# Patient Record
Sex: MALE | Race: WHITE | Employment: FULL TIME | URBAN - METROPOLITAN AREA
[De-identification: names, ages, dates, MRNs, and addresses within clinical notes are randomized per-mention and may not be internally consistent; named-entity substitution may affect disease eponyms.]

---

## 2017-07-03 DIAGNOSIS — Z91.09 ENVIRONMENTAL ALLERGIES: ICD-10-CM

## 2017-07-05 RX ORDER — LORATADINE AND PSEUDOEPHEDRINE 10; 240 MG/1; MG/1
1 TABLET, EXTENDED RELEASE ORAL DAILY PRN
Qty: 30 TABLET | Refills: 1 | Status: SHIPPED | OUTPATIENT
Start: 2017-07-05 | End: 2017-08-02 | Stop reason: SDUPTHER

## 2017-07-18 ENCOUNTER — TELEPHONE (OUTPATIENT)
Dept: INTERNAL MEDICINE CLINIC | Age: 52
End: 2017-07-18

## 2017-07-18 DIAGNOSIS — Z13.1 SCREENING FOR DIABETES MELLITUS: Primary | ICD-10-CM

## 2017-07-18 DIAGNOSIS — Z13.220 LIPID SCREENING: ICD-10-CM

## 2017-08-02 ENCOUNTER — INITIAL CONSULT (OUTPATIENT)
Dept: PSYCHOLOGY | Age: 52
End: 2017-08-02

## 2017-08-02 ENCOUNTER — OFFICE VISIT (OUTPATIENT)
Dept: INTERNAL MEDICINE CLINIC | Age: 52
End: 2017-08-02

## 2017-08-02 VITALS
BODY MASS INDEX: 29.66 KG/M2 | SYSTOLIC BLOOD PRESSURE: 131 MMHG | OXYGEN SATURATION: 98 % | WEIGHT: 178 LBS | HEIGHT: 65 IN | DIASTOLIC BLOOD PRESSURE: 84 MMHG | HEART RATE: 69 BPM

## 2017-08-02 DIAGNOSIS — Z91.09 ENVIRONMENTAL ALLERGIES: ICD-10-CM

## 2017-08-02 DIAGNOSIS — Z00.00 ANNUAL PHYSICAL EXAM: Primary | ICD-10-CM

## 2017-08-02 DIAGNOSIS — Z11.59 NEED FOR HEPATITIS C SCREENING TEST: ICD-10-CM

## 2017-08-02 DIAGNOSIS — Z13.31 POSITIVE DEPRESSION SCREENING: ICD-10-CM

## 2017-08-02 DIAGNOSIS — R19.5 LOOSE STOOLS: ICD-10-CM

## 2017-08-02 DIAGNOSIS — R03.0 ELEVATED BLOOD PRESSURE READING: ICD-10-CM

## 2017-08-02 DIAGNOSIS — Z13.220 LIPID SCREENING: ICD-10-CM

## 2017-08-02 DIAGNOSIS — F43.23 ADJUSTMENT DISORDER WITH MIXED ANXIETY AND DEPRESSED MOOD: Primary | ICD-10-CM

## 2017-08-02 LAB
ANION GAP SERPL CALCULATED.3IONS-SCNC: 15 MMOL/L (ref 3–16)
BUN BLDV-MCNC: 14 MG/DL (ref 7–20)
CALCIUM SERPL-MCNC: 9.4 MG/DL (ref 8.3–10.6)
CHLORIDE BLD-SCNC: 99 MMOL/L (ref 99–110)
CO2: 24 MMOL/L (ref 21–32)
CREAT SERPL-MCNC: 0.7 MG/DL (ref 0.9–1.3)
GFR AFRICAN AMERICAN: >60
GFR NON-AFRICAN AMERICAN: >60
GLUCOSE BLD-MCNC: 88 MG/DL (ref 70–99)
HEPATITIS C ANTIBODY INTERPRETATION: NORMAL
POTASSIUM SERPL-SCNC: 4.5 MMOL/L (ref 3.5–5.1)
SODIUM BLD-SCNC: 138 MMOL/L (ref 136–145)
TSH REFLEX: 1.34 UIU/ML (ref 0.27–4.2)

## 2017-08-02 PROCEDURE — 90791 PSYCH DIAGNOSTIC EVALUATION: CPT | Performed by: PSYCHOLOGIST

## 2017-08-02 PROCEDURE — 99396 PREV VISIT EST AGE 40-64: CPT | Performed by: INTERNAL MEDICINE

## 2017-08-02 PROCEDURE — G0444 DEPRESSION SCREEN ANNUAL: HCPCS | Performed by: INTERNAL MEDICINE

## 2017-08-02 PROCEDURE — G8431 POS CLIN DEPRES SCRN F/U DOC: HCPCS | Performed by: INTERNAL MEDICINE

## 2017-08-02 RX ORDER — LORATADINE AND PSEUDOEPHEDRINE 10; 240 MG/1; MG/1
1 TABLET, EXTENDED RELEASE ORAL DAILY PRN
Qty: 30 TABLET | Refills: 5 | Status: SHIPPED | OUTPATIENT
Start: 2017-08-02 | End: 2017-11-17 | Stop reason: ALTCHOICE

## 2017-08-02 ASSESSMENT — PATIENT HEALTH QUESTIONNAIRE - PHQ9
2. FEELING DOWN, DEPRESSED OR HOPELESS: 2
SUM OF ALL RESPONSES TO PHQ9 QUESTIONS 1 & 2: 2
SUM OF ALL RESPONSES TO PHQ QUESTIONS 1-9: 9
10. IF YOU CHECKED OFF ANY PROBLEMS, HOW DIFFICULT HAVE THESE PROBLEMS MADE IT FOR YOU TO DO YOUR WORK, TAKE CARE OF THINGS AT HOME, OR GET ALONG WITH OTHER PEOPLE: 1
4. FEELING TIRED OR HAVING LITTLE ENERGY: 2
1. LITTLE INTEREST OR PLEASURE IN DOING THINGS: 0
8. MOVING OR SPEAKING SO SLOWLY THAT OTHER PEOPLE COULD HAVE NOTICED. OR THE OPPOSITE, BEING SO FIGETY OR RESTLESS THAT YOU HAVE BEEN MOVING AROUND A LOT MORE THAN USUAL: 0
3. TROUBLE FALLING OR STAYING ASLEEP: 2
5. POOR APPETITE OR OVEREATING: 1
6. FEELING BAD ABOUT YOURSELF - OR THAT YOU ARE A FAILURE OR HAVE LET YOURSELF OR YOUR FAMILY DOWN: 1
9. THOUGHTS THAT YOU WOULD BE BETTER OFF DEAD, OR OF HURTING YOURSELF: 1
7. TROUBLE CONCENTRATING ON THINGS, SUCH AS READING THE NEWSPAPER OR WATCHING TELEVISION: 0

## 2017-08-03 ENCOUNTER — PATIENT MESSAGE (OUTPATIENT)
Dept: INTERNAL MEDICINE CLINIC | Age: 52
End: 2017-08-03

## 2017-08-04 LAB
CHOLESTEROL, FASTING: 217 MG/DL (ref 0–199)
HDLC SERPL-MCNC: 51 MG/DL (ref 40–60)
LDL CHOLESTEROL CALCULATED: 128 MG/DL
TRIGLYCERIDE, FASTING: 188 MG/DL (ref 0–150)
VLDLC SERPL CALC-MCNC: 38 MG/DL

## 2017-09-13 ENCOUNTER — OFFICE VISIT (OUTPATIENT)
Dept: INTERNAL MEDICINE CLINIC | Age: 52
End: 2017-09-13

## 2017-09-13 VITALS
WEIGHT: 180 LBS | SYSTOLIC BLOOD PRESSURE: 128 MMHG | HEART RATE: 67 BPM | OXYGEN SATURATION: 100 % | DIASTOLIC BLOOD PRESSURE: 87 MMHG | BODY MASS INDEX: 29.72 KG/M2

## 2017-09-13 DIAGNOSIS — E78.2 MIXED HYPERLIPIDEMIA: ICD-10-CM

## 2017-09-13 DIAGNOSIS — R03.0 BLOOD PRESSURE ELEVATED WITHOUT HISTORY OF HTN: Primary | ICD-10-CM

## 2017-09-13 PROCEDURE — 99213 OFFICE O/P EST LOW 20 MIN: CPT | Performed by: INTERNAL MEDICINE

## 2017-09-14 PROBLEM — R03.0 BLOOD PRESSURE ELEVATED WITHOUT HISTORY OF HTN: Status: ACTIVE | Noted: 2017-09-14

## 2017-09-14 PROBLEM — R03.0 TRANSIENT ELEVATED BLOOD PRESSURE: Status: ACTIVE | Noted: 2017-09-14

## 2017-09-14 PROBLEM — R03.0 TRANSIENT ELEVATED BLOOD PRESSURE: Status: RESOLVED | Noted: 2017-09-14 | Resolved: 2017-09-14

## 2017-09-14 PROBLEM — E78.2 MIXED HYPERLIPIDEMIA: Status: ACTIVE | Noted: 2017-09-14

## 2017-10-12 ENCOUNTER — TELEPHONE (OUTPATIENT)
Dept: INTERNAL MEDICINE CLINIC | Age: 52
End: 2017-10-12

## 2017-10-12 DIAGNOSIS — Z91.09 ENVIRONMENTAL ALLERGIES: ICD-10-CM

## 2017-10-12 NOTE — TELEPHONE ENCOUNTER
Pt is requesting a refill for Fluticasone Nasal Spray. Pt has not filled there before. Please call in new rx.

## 2017-10-13 RX ORDER — FLUTICASONE PROPIONATE 50 MCG
2 SPRAY, SUSPENSION (ML) NASAL DAILY
Qty: 1 BOTTLE | Refills: 5 | Status: SHIPPED | OUTPATIENT
Start: 2017-10-13 | End: 2018-05-23 | Stop reason: SDUPTHER

## 2017-11-17 ENCOUNTER — OFFICE VISIT (OUTPATIENT)
Dept: INTERNAL MEDICINE CLINIC | Age: 52
End: 2017-11-17

## 2017-11-17 VITALS
OXYGEN SATURATION: 96 % | HEART RATE: 82 BPM | BODY MASS INDEX: 29.39 KG/M2 | WEIGHT: 178 LBS | TEMPERATURE: 98.8 F | SYSTOLIC BLOOD PRESSURE: 118 MMHG | DIASTOLIC BLOOD PRESSURE: 84 MMHG

## 2017-11-17 DIAGNOSIS — R19.7 DIARRHEA, UNSPECIFIED TYPE: ICD-10-CM

## 2017-11-17 DIAGNOSIS — R05.9 COUGH: ICD-10-CM

## 2017-11-17 DIAGNOSIS — R63.0 POOR APPETITE: ICD-10-CM

## 2017-11-17 DIAGNOSIS — R19.7 DIARRHEA, UNSPECIFIED TYPE: Primary | ICD-10-CM

## 2017-11-17 DIAGNOSIS — R61 NIGHT SWEATS: ICD-10-CM

## 2017-11-17 LAB
A/G RATIO: 1.4 (ref 1.1–2.2)
ALBUMIN SERPL-MCNC: 4.4 G/DL (ref 3.4–5)
ALP BLD-CCNC: 69 U/L (ref 40–129)
ALT SERPL-CCNC: 27 U/L (ref 10–40)
ANION GAP SERPL CALCULATED.3IONS-SCNC: 18 MMOL/L (ref 3–16)
AST SERPL-CCNC: 27 U/L (ref 15–37)
BASOPHILS ABSOLUTE: 0 K/UL (ref 0–0.2)
BASOPHILS RELATIVE PERCENT: 0.4 %
BILIRUB SERPL-MCNC: 0.5 MG/DL (ref 0–1)
BUN BLDV-MCNC: 11 MG/DL (ref 7–20)
C-REACTIVE PROTEIN: 23.5 MG/L (ref 0–5.1)
CALCIUM SERPL-MCNC: 8.9 MG/DL (ref 8.3–10.6)
CHLORIDE BLD-SCNC: 96 MMOL/L (ref 99–110)
CO2: 25 MMOL/L (ref 21–32)
CREAT SERPL-MCNC: 0.8 MG/DL (ref 0.9–1.3)
EOSINOPHILS ABSOLUTE: 0.1 K/UL (ref 0–0.6)
EOSINOPHILS RELATIVE PERCENT: 1.5 %
GFR AFRICAN AMERICAN: >60
GFR NON-AFRICAN AMERICAN: >60
GLOBULIN: 3.1 G/DL
GLUCOSE BLD-MCNC: 75 MG/DL (ref 70–99)
HCT VFR BLD CALC: 47.4 % (ref 40.5–52.5)
HEMOGLOBIN: 16.1 G/DL (ref 13.5–17.5)
LYMPHOCYTES ABSOLUTE: 1.8 K/UL (ref 1–5.1)
LYMPHOCYTES RELATIVE PERCENT: 26.5 %
MCH RBC QN AUTO: 32.2 PG (ref 26–34)
MCHC RBC AUTO-ENTMCNC: 33.9 G/DL (ref 31–36)
MCV RBC AUTO: 95 FL (ref 80–100)
MONOCYTES ABSOLUTE: 1.1 K/UL (ref 0–1.3)
MONOCYTES RELATIVE PERCENT: 16.6 %
NEUTROPHILS ABSOLUTE: 3.6 K/UL (ref 1.7–7.7)
NEUTROPHILS RELATIVE PERCENT: 55 %
PDW BLD-RTO: 12.8 % (ref 12.4–15.4)
PLATELET # BLD: 260 K/UL (ref 135–450)
PMV BLD AUTO: 7.8 FL (ref 5–10.5)
POTASSIUM SERPL-SCNC: 4 MMOL/L (ref 3.5–5.1)
RBC # BLD: 4.99 M/UL (ref 4.2–5.9)
SEDIMENTATION RATE, ERYTHROCYTE: 26 MM/HR (ref 0–20)
SODIUM BLD-SCNC: 139 MMOL/L (ref 136–145)
TOTAL PROTEIN: 7.5 G/DL (ref 6.4–8.2)
WBC # BLD: 6.6 K/UL (ref 4–11)

## 2017-11-17 PROCEDURE — 99213 OFFICE O/P EST LOW 20 MIN: CPT | Performed by: INTERNAL MEDICINE

## 2017-11-19 ENCOUNTER — TELEPHONE (OUTPATIENT)
Dept: INTERNAL MEDICINE CLINIC | Age: 52
End: 2017-11-19

## 2017-11-19 DIAGNOSIS — R61 NIGHT SWEATS: Primary | ICD-10-CM

## 2017-11-20 LAB — MONO TEST: NEGATIVE

## 2017-11-20 NOTE — TELEPHONE ENCOUNTER
Please have lab add monospot. Order has been placed. Call to see if he is feeling any better. Labs with nonspecific findings. Could all be consistent with virus, but want to be sure he's starting to feel better.

## 2018-01-10 ENCOUNTER — OFFICE VISIT (OUTPATIENT)
Dept: INTERNAL MEDICINE CLINIC | Age: 53
End: 2018-01-10

## 2018-01-10 VITALS
BODY MASS INDEX: 30.39 KG/M2 | DIASTOLIC BLOOD PRESSURE: 81 MMHG | HEART RATE: 74 BPM | WEIGHT: 184 LBS | TEMPERATURE: 98.4 F | SYSTOLIC BLOOD PRESSURE: 142 MMHG

## 2018-01-10 DIAGNOSIS — R03.0 BLOOD PRESSURE ELEVATED WITHOUT HISTORY OF HTN: ICD-10-CM

## 2018-01-10 DIAGNOSIS — J01.00 ACUTE NON-RECURRENT MAXILLARY SINUSITIS: Primary | ICD-10-CM

## 2018-01-10 PROCEDURE — 99213 OFFICE O/P EST LOW 20 MIN: CPT | Performed by: INTERNAL MEDICINE

## 2018-01-10 RX ORDER — AMOXICILLIN AND CLAVULANATE POTASSIUM 875; 125 MG/1; MG/1
1 TABLET, FILM COATED ORAL 2 TIMES DAILY WITH MEALS
Qty: 20 TABLET | Refills: 0 | Status: SHIPPED | OUTPATIENT
Start: 2018-01-10 | End: 2018-01-20

## 2018-01-10 NOTE — PROGRESS NOTES
days Yes Saleem Benitez MD   terbinafine (LAMISIL AT) 1 % cream Apply topically 2 times daily. Yes Saleem Benitez MD   Multiple Vitamins-Minerals (THERAPEUTIC MULTIVITAMIN-MINERALS) tablet Take 1 tablet by mouth daily. Yes Historical Provider, MD   Loratadine (CLARITIN PO) Take by mouth  Historical Provider, MD   Cetirizine HCl (ZYRTEC ALLERGY PO) Take by mouth  Historical Provider, MD   fluticasone (FLONASE) 50 MCG/ACT nasal spray 2 sprays by Nasal route daily  Saleem Benitez MD       OBJECTIVE:  BP Readings from Last 3 Encounters:   01/10/18 (!) 142/81   11/17/17 118/84   09/13/17 128/87      Wt Readings from Last 3 Encounters:   01/10/18 184 lb (83.5 kg)   11/17/17 178 lb (80.7 kg)   09/13/17 180 lb (81.6 kg)     Vitals:    01/10/18 0902 01/10/18 0933   BP: (!) 154/78 (!) 142/81   Pulse: 74    Temp: 98.4 °F (36.9 °C)    TempSrc: Oral    Weight: 184 lb (83.5 kg)      Body mass index is 30.39 kg/m². Physical Exam   Constitutional: No distress. HENT:   Mouth/Throat: No oropharyngeal exudate. Bilat max sinus tenderness. TMs normal   Cardiovascular: Normal rate and regular rhythm. Pulmonary/Chest: Effort normal and breath sounds normal. He has no wheezes. He has no rales. Musculoskeletal: He exhibits no edema.

## 2018-05-23 DIAGNOSIS — Z91.09 ENVIRONMENTAL ALLERGIES: ICD-10-CM

## 2018-05-24 RX ORDER — FLUTICASONE PROPIONATE 50 MCG
SPRAY, SUSPENSION (ML) NASAL
Qty: 16 G | Refills: 5 | Status: SHIPPED | OUTPATIENT
Start: 2018-05-24 | End: 2019-06-23 | Stop reason: SDUPTHER

## 2018-12-11 ENCOUNTER — OFFICE VISIT (OUTPATIENT)
Dept: INTERNAL MEDICINE CLINIC | Age: 53
End: 2018-12-11
Payer: COMMERCIAL

## 2018-12-11 VITALS
OXYGEN SATURATION: 98 % | SYSTOLIC BLOOD PRESSURE: 118 MMHG | HEIGHT: 66 IN | DIASTOLIC BLOOD PRESSURE: 64 MMHG | BODY MASS INDEX: 29.73 KG/M2 | WEIGHT: 185 LBS | HEART RATE: 78 BPM

## 2018-12-11 DIAGNOSIS — Z13.1 SCREENING FOR DIABETES MELLITUS: ICD-10-CM

## 2018-12-11 DIAGNOSIS — E78.2 MIXED HYPERLIPIDEMIA: ICD-10-CM

## 2018-12-11 DIAGNOSIS — Z00.00 ANNUAL PHYSICAL EXAM: Primary | ICD-10-CM

## 2018-12-11 DIAGNOSIS — M62.89 MUSCLE TIGHTNESS: ICD-10-CM

## 2018-12-11 PROCEDURE — 90471 IMMUNIZATION ADMIN: CPT | Performed by: INTERNAL MEDICINE

## 2018-12-11 PROCEDURE — 99396 PREV VISIT EST AGE 40-64: CPT | Performed by: INTERNAL MEDICINE

## 2018-12-11 PROCEDURE — 90686 IIV4 VACC NO PRSV 0.5 ML IM: CPT | Performed by: INTERNAL MEDICINE

## 2018-12-11 RX ORDER — ASCORBIC ACID 500 MG
1000 TABLET ORAL DAILY
COMMUNITY

## 2018-12-11 ASSESSMENT — PATIENT HEALTH QUESTIONNAIRE - PHQ9
1. LITTLE INTEREST OR PLEASURE IN DOING THINGS: 0
SUM OF ALL RESPONSES TO PHQ QUESTIONS 1-9: 0
SUM OF ALL RESPONSES TO PHQ QUESTIONS 1-9: 0
SUM OF ALL RESPONSES TO PHQ9 QUESTIONS 1 & 2: 0
2. FEELING DOWN, DEPRESSED OR HOPELESS: 0

## 2018-12-11 NOTE — PATIENT INSTRUCTIONS
Benefiber or Citrucel. Ten basic rules for a good night's sleep  Sleep only as much as you need to feel rested and then get out of bed   Keep a regular sleep schedule   Avoid forcing sleep   Exercise regularly for at least 20 minutes, preferably 4 to 5 hours before bedtime   Avoid caffeinated beverages after lunch   Avoid alcohol near bedtime: no \"night cap\"   Avoid smoking, especially in the evening   Do not go to bed hungry   Adjust bedroom environment   Deal with your worries before bedtime       Basic Meditation Instructions  (taken and modified from: www. [employer]. [employer] by Kayy Love)  You are sitting or standing comfortably with your eyes closed. You are breathing comfortably through your nose. Feel the sensation of your breath as it flows in and out of your nostrils at the tip of your nose. Some people feel the sensation more strongly within the nostrils, while others feel it more on the upper lip. To help you locate where you feel the touch sensation of the breath most distinctly, inhale deeply and force the air out through your nostrils. Wherever you feel the sensation most clearly and precisely is the place to focus your attention for the balance of the meditation period. If you cant stay with this small target, shift to feeling the rise and fall of your abdomen or chest.    Feel the beginning, the middle, and the end of every in-breath, and the beginning, the middle, and the end of every out-breath and be present with the pauses in between. Sometimes the breath will be short--there is no need to make it longer. Sometimes the breath will be long--there is no need to make it shorter. Sometimes the breath will be erratic--there is no need to make it even or smooth. Just become aware of the breath as it goes in and out of the nostrils at the tip of the nose. Let the breath breathe itself.     Every time your attention moves away from the breath and shifts to another physical

## 2018-12-13 DIAGNOSIS — E78.2 MIXED HYPERLIPIDEMIA: Primary | ICD-10-CM

## 2019-04-22 ENCOUNTER — TELEPHONE (OUTPATIENT)
Dept: INTERNAL MEDICINE CLINIC | Age: 54
End: 2019-04-22

## 2019-04-22 NOTE — TELEPHONE ENCOUNTER
Patient has scheduled an appointment with Dr. Kirit Kenney to discuss Adult ADD and is wondering if this type of appointment will be covered by insurance? If it is not, what is the cost of an office visit? Patient can be reached at the number provided to advise.

## 2019-05-15 ENCOUNTER — PATIENT MESSAGE (OUTPATIENT)
Dept: INTERNAL MEDICINE CLINIC | Age: 54
End: 2019-05-15

## 2019-05-22 ENCOUNTER — OFFICE VISIT (OUTPATIENT)
Dept: INTERNAL MEDICINE CLINIC | Age: 54
End: 2019-05-22
Payer: COMMERCIAL

## 2019-05-22 VITALS
OXYGEN SATURATION: 98 % | WEIGHT: 182 LBS | HEART RATE: 72 BPM | SYSTOLIC BLOOD PRESSURE: 136 MMHG | DIASTOLIC BLOOD PRESSURE: 82 MMHG | BODY MASS INDEX: 29.83 KG/M2

## 2019-05-22 DIAGNOSIS — R41.840 INATTENTION: Primary | ICD-10-CM

## 2019-05-22 PROCEDURE — 99212 OFFICE O/P EST SF 10 MIN: CPT | Performed by: INTERNAL MEDICINE

## 2019-05-22 ASSESSMENT — PATIENT HEALTH QUESTIONNAIRE - PHQ9
2. FEELING DOWN, DEPRESSED OR HOPELESS: 0
SUM OF ALL RESPONSES TO PHQ QUESTIONS 1-9: 0
SUM OF ALL RESPONSES TO PHQ QUESTIONS 1-9: 0
1. LITTLE INTEREST OR PLEASURE IN DOING THINGS: 0
SUM OF ALL RESPONSES TO PHQ9 QUESTIONS 1 & 2: 0

## 2019-05-22 NOTE — PROGRESS NOTES
[facility]  Internal Medicine Progress Note  Freddie Schwab MD    DOS: 2019    Isabella Verduzco  :1965    ASSESSMENT/PLAN:   Inattention  Possible ADD. Advise should have appropriate comprehensive evaluation. Referring to Dr Nini Beyer. Discussed medications with patient who voiced understanding of their use and indications. All questions answered. This note was generated completely or in part utilizing Dragon dictation speech recognition software. Occasionally, words are mistranscribed and despite editing, the text may contain inaccuracies due to incorrect word recognition. If further clarification is needed please contact the office at [phone] Assistant Triage:  Chief Complaint   Patient presents with    ADHD       HPI:   This is a 62 y.o.male. He is here today for possible ADD. Daughter was just diagnosed with ADD, after being previously misdiagnosed with depression. Made him wonder if he has after doing some reading. Has change her life. Has had lifelong struggles that he thinks may be ADD. 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 Also struggles at home. classic traits he has are poor attention. Hard time with completion of tasks. Interrupts people. Blurts things out. Sleeps at close to 8 hours. 2 cups of 1/2 caff daily     Review of Systems As per HPI. Patient Active Problem List   Diagnosis    Environmental allergies    Mixed hyperlipidemia    Blood pressure elevated without history of HTN     Prior to Visit Medications    Medication Sig Taking?  Authorizing Provider   Calcium-Magnesium-Vitamin D (CALCIUM MAGNESIUM PO) Take by mouth Yes Historical Provider, MD   BIOTIN FORTE PO Take by mouth Yes Historical Provider, MD   Flaxseed, Linseed, (FLAX SEED OIL PO) Take by mouth Yes Historical Provider, MD   vitamin C (ASCORBIC ACID) 500 MG tablet Take 1,000 mg by mouth daily Yes Historical Provider, MD   fluticasone (FLONASE) 50 MCG/ACT nasal spray SPRAY 2 SPRAYS BY NASAL ROUTE DAILY Yes Leonela Laughlin MD   Probiotic Product (PROBIOTIC PO) Take by mouth Yes Historical Provider, MD   terbinafine (LAMISIL AT) 1 % cream Apply topically 2 times daily. Yes Leonela Laughlin MD     No Known Allergies  Social History     Tobacco Use    Smoking status: Former Smoker     Packs/day: 0.25     Years: 5.00     Pack years: 1.25     Types: Cigarettes     Last attempt to quit: 1992     Years since quittin.4    Smokeless tobacco: Never Used    Tobacco comment: Quit 20 years ago - social smoker when I was younger   Substance Use Topics    Alcohol use: Yes     Types: 5 Glasses of wine, 2 Cans of beer per week    Drug use: No       BP Readings from Last 3 Encounters:   19 136/82   18 118/64   01/10/18 (!) 142/81     Wt Readings from Last 3 Encounters:   19 182 lb (82.6 kg)   18 185 lb (83.9 kg)   01/10/18 184 lb (83.5 kg)     OBJECTIVE:  Vitals:    19 0900   BP: 136/82   Pulse: 72   SpO2: 98%   Weight: 182 lb (82.6 kg)       Physical Exam   Psychiatric: He has a normal mood and affect.  His behavior is normal.

## 2019-06-23 DIAGNOSIS — Z91.09 ENVIRONMENTAL ALLERGIES: ICD-10-CM

## 2019-06-24 RX ORDER — FLUTICASONE PROPIONATE 50 MCG
SPRAY, SUSPENSION (ML) NASAL
Qty: 16 G | Refills: 5 | Status: SHIPPED | OUTPATIENT
Start: 2019-06-24

## 2020-01-29 ENCOUNTER — PATIENT MESSAGE (OUTPATIENT)
Dept: INTERNAL MEDICINE CLINIC | Age: 55
End: 2020-01-29

## 2020-02-05 ENCOUNTER — OFFICE VISIT (OUTPATIENT)
Dept: INTERNAL MEDICINE CLINIC | Age: 55
End: 2020-02-05
Payer: COMMERCIAL

## 2020-02-05 VITALS
HEIGHT: 66 IN | HEART RATE: 72 BPM | DIASTOLIC BLOOD PRESSURE: 82 MMHG | OXYGEN SATURATION: 98 % | BODY MASS INDEX: 28.61 KG/M2 | WEIGHT: 178 LBS | SYSTOLIC BLOOD PRESSURE: 124 MMHG

## 2020-02-05 PROCEDURE — 99396 PREV VISIT EST AGE 40-64: CPT | Performed by: INTERNAL MEDICINE

## 2020-02-05 SDOH — HEALTH STABILITY: MENTAL HEALTH: HOW OFTEN DO YOU HAVE A DRINK CONTAINING ALCOHOL?: 4 OR MORE TIMES A WEEK

## 2020-02-05 SDOH — HEALTH STABILITY: MENTAL HEALTH: HOW MANY STANDARD DRINKS CONTAINING ALCOHOL DO YOU HAVE ON A TYPICAL DAY?: 1 OR 2

## 2020-02-05 ASSESSMENT — PATIENT HEALTH QUESTIONNAIRE - PHQ9
2. FEELING DOWN, DEPRESSED OR HOPELESS: 0
SUM OF ALL RESPONSES TO PHQ9 QUESTIONS 1 & 2: 0
1. LITTLE INTEREST OR PLEASURE IN DOING THINGS: 0
SUM OF ALL RESPONSES TO PHQ QUESTIONS 1-9: 0
SUM OF ALL RESPONSES TO PHQ QUESTIONS 1-9: 0

## 2020-02-05 NOTE — PROGRESS NOTES
Vaccine Information Sheet, \"Influenza - Inactivated\"  given to Mihaela Cartagena, or parent/legal guardian of  Mihaela Cartagena and verbalized understanding. Patient responses:    Have you ever had a reaction to a flu vaccine? No  Do you have any current illness? No  Have you ever had Gu[place] Syndrome? No  Do you have a serious allergy to any of the follow: Neomycin, Polymyxin, Thimerosal, eggs or egg products? No    Flu vaccine given per order. Please see immunization tab. Risks and benefits explained. Current VIS given.

## 2020-02-05 NOTE — PATIENT INSTRUCTIONS
Recommendations for optimal health:  Be sure you are exercising at least 30 minutes  or 10,000 steps daily. Ideally you should try to get a mix of cardio and strength exercises. Work on [employer] For more detailed information, visit Nutrition Source web site- knowledge for healthy eating from [address] [place] [address]. [facility]      If your are using supplements, look for \"USP verified\" on the label. Helps to assure they are good quality. Vitamin D 800 units daily. Calcium intake - try for 800-1200 mg of calcium in combination of diet and supplements. You can read on this in much more detail on nutritionSibarituse. org    8 Nutrition Tips for Eating Right:  1. Choose good carbs, not no carbs. Whole grains are your best bet. 2. Pay attention to the protein package. Fish, poultry, nuts, and beans are the best choices. 3. Choose foods with healthy fats, limit foods high in saturated fat, and avoid foods with trans fat. Plant oils, nuts, and fish are the healthiest sources. 4. Choose a fiber-filled diet, rich in whole grains, vegetables, and fruits. 5. Eat more vegetables and fruits. Go for color and variety--dark green, yellow, orange, and red. 6. Calcium is important. But milk isnt the only, or even best, source. 7. Water is best to quench your thirst. Skip the sugary drinks, and go easy on the milk and juice. 8. Eating less salt is good for everyones health. Choose more fresh foods and fewer processed foods. Aim for 2-3 cups of vegetables daily and 1 1/2-2 cups of fruits daily.

## 2020-02-05 NOTE — PROGRESS NOTES
Annual Physical Exam      Tiny Pedroza  YOB: 1965    Date of Service:  2/5/2020    Chief Complaint:  62 y.o. male here for    Chief Complaint   Patient presents with    Annual Exam   .    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 Exercise: daily yoga , 30 minute, Laurie, on youtube. Walks dogs about 3 days/week 20-45 minutes weather depending. Also more active with photography.    Diet:balanced diet    Health Maintenance   Topic Date Due    Shingles Vaccine (1 of 2) 05/17/2013    Flu vaccine (1) 09/01/2019    DTaP/Tdap/Td vaccine (2 - Td) 05/30/2024    Lipid screen  01/31/2025    Colon cancer screen colonoscopy  02/20/2025    Hepatitis C screen  Completed    Hepatitis A vaccine  Aged Out    Hepatitis B vaccine  Aged Out    Hib vaccine  Aged Out    Meningococcal (ACWY) vaccine  Aged Out    Pneumococcal 0-64 years Vaccine  Aged Out    HIV screen  Discontinued       Patient Care Team:  Viraj Hagan MD as PCP - General (Internal Medicine)  Viraj Hagan MD as PCP [facility]  Bethany Dickey MD as Consulting Physician (Dermatology)    Immunization History   Administered Date(s) Administered    Influenza, Quadv, IM, PF (6 mo and older Fluzone, Flulaval, Fluarix, and 3 yrs and older Afluria) 12/11/2018    Tdap (Boostrix, Adacel) 05/30/2014       No Known Allergies    Outpatient Medications Marked as Taking for the 2/5/20 encounter (Office Visit) with Viraj Hagan MD   Medication Sig Dispense Refill    MELATONIN PO Take by mouth      fluticasone (FLONASE) 50 MCG/ACT nasal spray SPRAY 2 SPRAYS BY NASAL ROUTE DAILY 16 g 5    Calcium-Magnesium-Vitamin D (CALCIUM MAGNESIUM PO) Take by mouth      BIOTIN FORTE PO Take by mouth      Flaxseed, Linseed, (FLAX SEED OIL PO) Take by mouth      vitamin C (ASCORBIC ACID) 500 MG tablet Take 1,000 mg by mouth daily      Probiotic Product (PROBIOTIC PO) Take by mouth         Past

## 2020-02-06 PROCEDURE — 90686 IIV4 VACC NO PRSV 0.5 ML IM: CPT | Performed by: INTERNAL MEDICINE

## 2020-02-06 PROCEDURE — 90471 IMMUNIZATION ADMIN: CPT | Performed by: INTERNAL MEDICINE

## 2020-07-09 ENCOUNTER — PATIENT MESSAGE (OUTPATIENT)
Dept: INTERNAL MEDICINE CLINIC | Age: 55
End: 2020-07-09

## 2020-07-09 NOTE — TELEPHONE ENCOUNTER
From: Jeny Officer  To: James Trinidad MD  Sent: 7/9/2020 8:19 AM EDT  Subject: Non-Urgent Medical Question    Hello! I had requested a medicine to be added a couple of weeks ago because I need a refill - it's a topical which I use when a skin rash flares up (usually due to stress ). Can someone please look at that? Thank you!     Karel Valadez

## 2020-08-18 ENCOUNTER — PATIENT MESSAGE (OUTPATIENT)
Dept: INTERNAL MEDICINE CLINIC | Age: 55
End: 2020-08-18

## 2020-08-18 NOTE — TELEPHONE ENCOUNTER
From: Carly Ortiz  To: Toby English MD  Sent: 8/18/2020 9:18 AM EDT  Subject: Non-Urgent Medical Question    Heljaja! Herminia Malone and I will be visiting Maite's parents at a [address] over Labor Day and we would like to get Covid tested with the results coming in as close to leaving as possible but still accurate. Where do you recommend we get tested and when should we go if we leave the Thursday before?

## 2020-08-26 ENCOUNTER — OFFICE VISIT (OUTPATIENT)
Dept: PRIMARY CARE CLINIC | Age: 55
End: 2020-08-26
Payer: COMMERCIAL

## 2020-08-26 PROCEDURE — 99211 OFF/OP EST MAY X REQ PHY/QHP: CPT | Performed by: NURSE PRACTITIONER

## 2020-08-26 NOTE — PROGRESS NOTES
Balaji Medrano received a viral test for COVID-19. They were educated on isolation and quarantine as appropriate. For any symptoms, they were directed to seek care from their PCP, given contact information to establish with a doctor, directed to an urgent care or the emergency room.

## 2020-08-27 LAB — SARS-COV-2, NAA: NOT DETECTED

## 2020-11-10 ENCOUNTER — OFFICE VISIT (OUTPATIENT)
Dept: INTERNAL MEDICINE CLINIC | Age: 55
End: 2020-11-10
Payer: COMMERCIAL

## 2020-11-10 VITALS
HEART RATE: 76 BPM | BODY MASS INDEX: 27.13 KG/M2 | DIASTOLIC BLOOD PRESSURE: 78 MMHG | WEIGHT: 179 LBS | SYSTOLIC BLOOD PRESSURE: 120 MMHG | OXYGEN SATURATION: 97 % | TEMPERATURE: 97.1 F | HEIGHT: 68 IN

## 2020-11-10 PROBLEM — R03.0 BLOOD PRESSURE ELEVATED WITHOUT HISTORY OF HTN: Status: RESOLVED | Noted: 2017-09-14 | Resolved: 2020-11-10

## 2020-11-10 PROCEDURE — 99213 OFFICE O/P EST LOW 20 MIN: CPT | Performed by: INTERNAL MEDICINE

## 2020-11-10 NOTE — PROGRESS NOTES
Glucosamine-Chondroit-Vit C-Mn (GLUCOSAMINE CHONDR 1500 COMPLX PO) Take 2 tablets by mouth daily Yes Historical Provider, MD   MELATONIN PO Take by mouth Yes Historical Provider, MD   fluticasone (FLONASE) 50 MCG/ACT nasal spray SPRAY 2 SPRAYS BY NASAL ROUTE DAILY Yes Daniel Stanton MD   Calcium-Magnesium-Vitamin D (CALCIUM MAGNESIUM PO) Take by mouth Yes Historical Provider, MD   BIOTIN FORTE PO Take by mouth Yes Historical Provider, MD   Flaxseed, Linseed, (FLAX SEED OIL PO) Take by mouth Yes Historical Provider, MD   vitamin C (ASCORBIC ACID) 500 MG tablet Take 1,000 mg by mouth daily Yes Historical Provider, MD   Probiotic Product (PROBIOTIC PO) Take by mouth Yes Historical Provider, MD     No Known Allergies  Social History     Tobacco Use    Smoking status: Former Smoker     Packs/day: 0.25     Years: 5.00     Pack years: 1.25     Types: Cigarettes     Last attempt to quit: 1992     Years since quittin.8    Smokeless tobacco: Never Used    Tobacco comment: Quit 20 years ago - social smoker when I was younger   Substance Use Topics    Alcohol use: Yes     Alcohol/week: 20.0 - 24.0 standard drinks     Types: 5 Glasses of wine, 5 Cans of beer, 10 - 14 Standard drinks or equivalent per week     Frequency: 4 or more times a week     Drinks per session: 1 or 2    Drug use: No       BP Readings from Last 3 Encounters:   11/10/20 120/78   20 124/82   19 136/82     Wt Readings from Last 3 Encounters:   11/10/20 179 lb (81.2 kg)   20 178 lb (80.7 kg)   19 182 lb (82.6 kg)     OBJECTIVE:  Vitals:    11/10/20 1529   BP: 120/78   Site: Left Upper Arm   Position: Sitting   Cuff Size: Large Adult   Pulse: 76   Temp: 97.1 °F (36.2 °C)   SpO2: 97%   Weight: 179 lb (81.2 kg)   Height: 5' 7.75\" (1.721 m)       Physical Exam  Musculoskeletal:      Lumbar back: He exhibits normal range of motion, no tenderness and no bony tenderness.       Left foot: No tenderness or swelling (Firm nodule plantar surface, in arch). Neurological:      Motor: No weakness (lower extremities). Deep Tendon Reflexes:      Reflex Scores:       Patellar reflexes are 2+ on the right side and 2+ on the left side. Comments: Negative straight leg raise.

## 2021-02-09 ENCOUNTER — OFFICE VISIT (OUTPATIENT)
Dept: INTERNAL MEDICINE CLINIC | Age: 56
End: 2021-02-09
Payer: COMMERCIAL

## 2021-02-09 VITALS
SYSTOLIC BLOOD PRESSURE: 108 MMHG | WEIGHT: 179 LBS | BODY MASS INDEX: 28.77 KG/M2 | HEIGHT: 66 IN | DIASTOLIC BLOOD PRESSURE: 70 MMHG | OXYGEN SATURATION: 98 % | TEMPERATURE: 97.2 F | HEART RATE: 72 BPM

## 2021-02-09 DIAGNOSIS — Z86.16 PERSONAL HISTORY OF COVID-19: ICD-10-CM

## 2021-02-09 DIAGNOSIS — Z00.00 ANNUAL PHYSICAL EXAM: Primary | ICD-10-CM

## 2021-02-09 DIAGNOSIS — Z13.220 LIPID SCREENING: ICD-10-CM

## 2021-02-09 DIAGNOSIS — Z13.1 SCREENING FOR DIABETES MELLITUS: ICD-10-CM

## 2021-02-09 DIAGNOSIS — R53.83 FATIGUE, UNSPECIFIED TYPE: ICD-10-CM

## 2021-02-09 DIAGNOSIS — Z12.5 PROSTATE CANCER SCREENING: ICD-10-CM

## 2021-02-09 PROCEDURE — 99396 PREV VISIT EST AGE 40-64: CPT | Performed by: INTERNAL MEDICINE

## 2021-02-09 PROCEDURE — G8482 FLU IMMUNIZE ORDER/ADMIN: HCPCS | Performed by: INTERNAL MEDICINE

## 2021-02-09 ASSESSMENT — PATIENT HEALTH QUESTIONNAIRE - PHQ9
SUM OF ALL RESPONSES TO PHQ QUESTIONS 1-9: 2
SUM OF ALL RESPONSES TO PHQ9 QUESTIONS 1 & 2: 2
2. FEELING DOWN, DEPRESSED OR HOPELESS: 1

## 2021-02-09 NOTE — PROGRESS NOTES
Annual Physical Exam      Travis Wilde  YOB: 1965    Date of Service:  2/9/2021    Chief Complaint:  60 y.o. male here for    Chief Complaint   Patient presents with    Annual Exam   .    HPI:   Recovering from covid. Still with fatigue. Easily now sleeps 10 hours and then naps as well. Dry cough, steadily improving. Not short of breath.      Exercise: 3 days/week    Health Maintenance   Topic Date Due    Shingles Vaccine (1 of 2) 05/17/2013    Diabetes screen  01/31/2023    DTaP/Tdap/Td vaccine (2 - Td) 05/30/2024    Lipid screen  01/31/2025    Colon cancer screen colonoscopy  02/20/2025    Flu vaccine  Completed    Hepatitis C screen  Completed    Hepatitis A vaccine  Aged Out    Hepatitis B vaccine  Aged Out    Hib vaccine  Aged Out    Meningococcal (ACWY) vaccine  Aged Out    Pneumococcal 0-64 years Vaccine  Aged Out    HIV screen  Discontinued       Patient Care Team:  Lola Aviles MD as PCP - General (Internal Medicine)  Lola Aviles MD as PCP - [address] [place] [address] Provider  Homer Gleason MD as Consulting Physician (Dermatology)    Immunization History   Administered Date(s) Administered    Influenza Virus Vaccine 10/09/2020    Influenza, MDCK Quadv, IM, PF (Flucelvax 4 yrs and older) 10/09/2020    Influenza, Esther Cipro, IM, PF (6 mo and older Fluzone, Flulaval, Fluarix, and 3 yrs and older Afluria) 12/11/2018, 02/06/2020    Tdap (Boostrix, Adacel) 05/30/2014       No Known Allergies    Outpatient Medications Marked as Taking for the 2/9/21 encounter (Office Visit) with Lola Aviles MD   Medication Sig Dispense Refill    dextromethorphan-guaiFENesin ([place] 598 DM)  MG per extended release tablet Take 1 tablet by mouth every 12 hours as needed      Glucosamine-Chondroit-Vit C-Mn (GLUCOSAMINE CHONDR 1500 COMPLX PO) Take 2 tablets by mouth daily      MELATONIN PO Take by mouth      fluticasone (FLONASE) 50 MCG/ACT nasal spray SPRAY 2 SPRAYS BY NASAL ROUTE DAILY 16 g 5  Calcium-Magnesium-Vitamin D (CALCIUM MAGNESIUM PO) Take by mouth      BIOTIN FORTE PO Take by mouth      Flaxseed, Linseed, (FLAX SEED OIL PO) Take by mouth      vitamin C (ASCORBIC ACID) 500 MG tablet Take 1,000 mg by mouth daily      Probiotic Product (PROBIOTIC PO) Take by mouth         Past Medical History:   Diagnosis Date    Environmental allergies     History of depression     in family    Personal history of covid-19 2021    Scoliosis     as teen     Past Surgical History:   Procedure Laterality Date    NASAL SEPTUM SURGERY      TONSILLECTOMY       Family History   Problem Relation Age of Onset    Depression Mother     Alzheimer's Disease Mother 80    Coronary Art Dis Father 76        stents and bypass    Other Sister         anxiety and depression    Hypertension Sister     Other Sister         MS    Alzheimer's Disease Maternal Aunt     Alzheimer's Disease Maternal Grandmother     ADHD Daughter      Social History     Tobacco Use    Smoking status: Former Smoker     Packs/day: 0.25     Years: 5.00     Pack years: 1.25     Types: Cigarettes     Quit date: 1992     Years since quittin.1    Smokeless tobacco: Never Used    Tobacco comment: Quit 20 years ago - social smoker when I was younger   Substance Use Topics    Alcohol use:  Yes     Alcohol/week: 20.0 standard drinks     Types: 5 Glasses of wine, 5 Cans of beer, 10 Standard drinks or equivalent per week     Frequency: 4 or more times a week     Drinks per session: 1 or 2    Drug use: No       Review of Systems:  As documented in HPI and patient questionnaire (scanned)    BP Readings from Last 3 Encounters:   21 108/70   11/10/20 120/78   20 124/82      Wt Readings from Last 5 Encounters:   21 179 lb (81.2 kg)   11/10/20 179 lb (81.2 kg)   20 178 lb (80.7 kg)   19 182 lb (82.6 kg)   18 185 lb (83.9 kg)     Physical Exam:   Vitals:    21 1401   BP: 108/70 Site: Right Upper Arm   Position: Sitting   Cuff Size: Medium Adult   Pulse: 72   Temp: 97.2 °F (36.2 °C)   SpO2: 98%   Weight: 179 lb (81.2 kg)   Height: 5' 5.5\" (1.664 m)     Body mass index is 29.33 kg/m². Physical Exam  Constitutional:       Appearance: He is well-developed. HENT:      Head: Normocephalic and atraumatic. Right Ear: Tympanic membrane and ear canal normal.      Left Ear: Tympanic membrane and ear canal normal.      Ears:      Comments: Partial cerumen impaction bilat     Mouth/Throat:      Mouth: Mucous membranes are moist.      Pharynx: Oropharynx is clear. Eyes:      Conjunctiva/sclera: Conjunctivae normal.      Pupils: Pupils are equal, round, and reactive to light. Neck:      Musculoskeletal: Normal range of motion. Thyroid: No thyromegaly. Vascular: No carotid bruit. Cardiovascular:      Rate and Rhythm: Normal rate and regular rhythm. Heart sounds: Normal heart sounds. No murmur. Pulmonary:      Effort: Pulmonary effort is normal.      Breath sounds: Normal breath sounds. Abdominal:      General: Bowel sounds are normal.      Palpations: Abdomen is soft. There is no mass. Tenderness: There is no abdominal tenderness. Musculoskeletal:      Right lower leg: No edema. Left lower leg: No edema. Lymphadenopathy:      Cervical: No cervical adenopathy. Skin:     Coloration: Skin is not pale. Findings: No rash. Comments: No suspicious lesions   Neurological:      General: No focal deficit present. Mental Status: He is alert and oriented to person, place, and time. Psychiatric:         Mood and Affect: Mood normal.       Lab Results   Component Value Date    CHOL 210 (H) 12/16/2015    TRIG 129 12/16/2015    HDL 59 01/31/2020    LDLCALC 132 (H) 01/31/2020     Glucose:   Glucose (mg/dL)   Date Value   11/17/2017 75     Assessment/Plan:  1.  Annual PE/Wellness exam

## 2021-02-16 ENCOUNTER — PATIENT MESSAGE (OUTPATIENT)
Dept: INTERNAL MEDICINE CLINIC | Age: 56
End: 2021-02-16

## 2021-02-16 DIAGNOSIS — Z82.49 FH: CAD (CORONARY ARTERY DISEASE): ICD-10-CM

## 2021-02-16 DIAGNOSIS — E78.00 HYPERCHOLESTEREMIA: Primary | ICD-10-CM

## 2021-02-26 ENCOUNTER — HOSPITAL ENCOUNTER (OUTPATIENT)
Dept: CT IMAGING | Age: 56
Discharge: HOME | End: 2021-02-26
Payer: COMMERCIAL

## 2021-02-26 DIAGNOSIS — Z82.49 FH: CAD (CORONARY ARTERY DISEASE): ICD-10-CM

## 2021-02-26 DIAGNOSIS — E78.00 HYPERCHOLESTEREMIA: ICD-10-CM

## 2021-02-26 PROCEDURE — 75571 CT HRT W/O DYE W/CA TEST: CPT

## 2021-02-28 DIAGNOSIS — E78.2 MIXED HYPERLIPIDEMIA: Primary | ICD-10-CM

## 2021-02-28 RX ORDER — ASPIRIN 81 MG/1
81 TABLET ORAL DAILY
Qty: 90 TABLET | Refills: 1 | COMMUNITY
Start: 2021-02-28

## 2021-02-28 RX ORDER — ATORVASTATIN CALCIUM 40 MG/1
40 TABLET, FILM COATED ORAL DAILY
Qty: 30 TABLET | Refills: 5 | Status: SHIPPED | OUTPATIENT
Start: 2021-02-28 | End: 2021-07-28 | Stop reason: SDUPTHER

## 2021-03-18 ENCOUNTER — IMMUNIZATION (OUTPATIENT)
Dept: PRIMARY CARE CLINIC | Age: 56
End: 2021-03-18
Payer: COMMERCIAL

## 2021-03-18 PROCEDURE — 91301 COVID-19, MODERNA VACCINE 100MCG/0.5ML DOSE: CPT | Performed by: FAMILY MEDICINE

## 2021-03-18 PROCEDURE — 0011A COVID-19, MODERNA VACCINE 100MCG/0.5ML DOSE: CPT | Performed by: FAMILY MEDICINE

## 2021-03-19 ENCOUNTER — TELEPHONE (OUTPATIENT)
Dept: INTERNAL MEDICINE CLINIC | Age: 56
End: 2021-03-19

## 2021-04-15 ENCOUNTER — IMMUNIZATION (OUTPATIENT)
Dept: PRIMARY CARE CLINIC | Age: 56
End: 2021-04-15
Payer: COMMERCIAL

## 2021-04-15 PROCEDURE — 91301 COVID-19, MODERNA VACCINE 100MCG/0.5ML DOSE: CPT | Performed by: FAMILY MEDICINE

## 2021-04-15 PROCEDURE — 0012A COVID-19, MODERNA VACCINE 100MCG/0.5ML DOSE: CPT | Performed by: FAMILY MEDICINE

## 2021-05-05 ENCOUNTER — PATIENT MESSAGE (OUTPATIENT)
Dept: INTERNAL MEDICINE CLINIC | Age: 56
End: 2021-05-05

## 2021-05-05 NOTE — TELEPHONE ENCOUNTER
From: Romayne Needs  To: Vince Orellana MD  Sent: 5/5/2021 12:26 PM EDT  Subject: Non-Urgent Medical Question    I want to know if I should get my blood work re-done now to re-check my cholesteral. Also, if you have the shingles vaccine, I would like to get that as well. I got my second Erlene Bunk shot on 4/15 and believe there is a waiting period for other vaccinations so I'm not sure. Thank you!   Bertram Bar

## 2021-06-01 ENCOUNTER — NURSE ONLY (OUTPATIENT)
Dept: INTERNAL MEDICINE CLINIC | Age: 56
End: 2021-06-01
Payer: COMMERCIAL

## 2021-06-01 PROCEDURE — 90471 IMMUNIZATION ADMIN: CPT | Performed by: INTERNAL MEDICINE

## 2021-06-01 PROCEDURE — 90750 HZV VACC RECOMBINANT IM: CPT | Performed by: INTERNAL MEDICINE

## 2021-07-28 ENCOUNTER — PATIENT MESSAGE (OUTPATIENT)
Dept: INTERNAL MEDICINE CLINIC | Age: 56
End: 2021-07-28

## 2021-07-28 RX ORDER — ATORVASTATIN CALCIUM 40 MG/1
40 TABLET, FILM COATED ORAL DAILY
Qty: 30 TABLET | Refills: 5 | Status: SHIPPED | OUTPATIENT
Start: 2021-07-28 | End: 2022-01-14 | Stop reason: SDUPTHER

## 2021-07-28 NOTE — TELEPHONE ENCOUNTER
From: Glorious Snellen  To: Tj Osullivan MD  Sent: 7/28/2021 8:14 AM EDT  Subject: Prescription Question    Hi! Can you please call in my refills of atorcastatin to the [facility] on [address] in [facility]? They are a lot cheaper. Thank you!

## 2021-07-28 NOTE — TELEPHONE ENCOUNTER
Last appointment: 2/9/2021  Next appointment: Visit date not found  Last refill: 30 with 5 02/28/2021  Sent [email or web address] message to schedule due/overdue appointment.

## 2021-12-01 ENCOUNTER — NURSE ONLY (OUTPATIENT)
Dept: INTERNAL MEDICINE CLINIC | Age: 56
End: 2021-12-01
Payer: COMMERCIAL

## 2021-12-01 PROCEDURE — 90471 IMMUNIZATION ADMIN: CPT | Performed by: INTERNAL MEDICINE

## 2021-12-01 PROCEDURE — 90750 HZV VACC RECOMBINANT IM: CPT | Performed by: INTERNAL MEDICINE

## 2022-01-13 ENCOUNTER — PATIENT MESSAGE (OUTPATIENT)
Dept: INTERNAL MEDICINE CLINIC | Age: 57
End: 2022-01-13

## 2022-01-14 RX ORDER — ATORVASTATIN CALCIUM 40 MG/1
40 TABLET, FILM COATED ORAL DAILY
Qty: 90 TABLET | Refills: 1 | Status: SHIPPED | OUTPATIENT
Start: 2022-01-14 | End: 2022-08-17

## 2022-01-14 NOTE — TELEPHONE ENCOUNTER
Last appointment: 2/9/2021  Next appointment: Visit date not found  Last refill: 7/28/21  Sent [employer] message to schedule due/overdue appointment.

## 2022-01-14 NOTE — TELEPHONE ENCOUNTER
From: Yola Jiang  To: Dr. Leydi Thorne  Sent: 1/13/2022 9:07 PM EST  Subject: 3 month supply    Hello - can you please update my Avastatin to a 3 month supply at my [facility]? Will make things a lot easier - I believe I need a refill. Thank you!     Fran Crawford

## 2022-01-31 ENCOUNTER — TELEPHONE (OUTPATIENT)
Dept: INTERNAL MEDICINE CLINIC | Age: 57
End: 2022-01-31

## 2022-01-31 DIAGNOSIS — Z13.1 SCREENING FOR DIABETES MELLITUS: Primary | ICD-10-CM

## 2022-01-31 DIAGNOSIS — Z13.220 LIPID SCREENING: ICD-10-CM

## 2022-01-31 DIAGNOSIS — Z12.5 PROSTATE CANCER SCREENING: ICD-10-CM

## 2022-01-31 NOTE — TELEPHONE ENCOUNTER
Left message for patient letting him know the lab orders are placed and he can stop by anytime to have these drawn.

## 2022-01-31 NOTE — TELEPHONE ENCOUNTER
----- Message from [place] sent at 1/31/2022  1:04 PM EST -----  Subject: Message to Provider    QUESTIONS  Information for Provider? Pt would like to get his annual blood work done   before his appointment is Dr Cynthia Trujillo could put it in before 02/18 so it can   be discussed at his appointment   ---------------------------------------------------------------------------  --------------  [address]  What is the best way for the office to contact you? OK to respond with   electronic message via [employer] portal (only for patients who have   registered [employer] account)  Preferred Call Back Phone Number? [phone]  ---------------------------------------------------------------------------  --------------  SCRIPT ANSWERS  Relationship to Patient?  Self

## 2022-02-18 ENCOUNTER — OFFICE VISIT (OUTPATIENT)
Dept: INTERNAL MEDICINE CLINIC | Age: 57
End: 2022-02-18
Payer: COMMERCIAL

## 2022-02-18 VITALS
SYSTOLIC BLOOD PRESSURE: 122 MMHG | BODY MASS INDEX: 29.83 KG/M2 | HEART RATE: 76 BPM | HEIGHT: 66 IN | WEIGHT: 185.6 LBS | OXYGEN SATURATION: 98 % | RESPIRATION RATE: 16 BRPM | DIASTOLIC BLOOD PRESSURE: 78 MMHG

## 2022-02-18 DIAGNOSIS — Z00.00 WELLNESS EXAMINATION: Primary | ICD-10-CM

## 2022-02-18 DIAGNOSIS — R93.1 AGATSTON CORONARY ARTERY CALCIUM SCORE BETWEEN 100 AND 400: ICD-10-CM

## 2022-02-18 DIAGNOSIS — R74.01 ELEVATED AST (SGOT): ICD-10-CM

## 2022-02-18 DIAGNOSIS — E78.2 MIXED HYPERLIPIDEMIA: ICD-10-CM

## 2022-02-18 PROCEDURE — G8484 FLU IMMUNIZE NO ADMIN: HCPCS | Performed by: INTERNAL MEDICINE

## 2022-02-18 PROCEDURE — 99396 PREV VISIT EST AGE 40-64: CPT | Performed by: INTERNAL MEDICINE

## 2022-02-18 SDOH — HEALTH STABILITY: PHYSICAL HEALTH: ON AVERAGE, HOW MANY DAYS PER WEEK DO YOU ENGAGE IN MODERATE TO STRENUOUS EXERCISE (LIKE A BRISK WALK)?: 4 DAYS

## 2022-02-18 SDOH — ECONOMIC STABILITY: FOOD INSECURITY: WITHIN THE PAST 12 MONTHS, THE FOOD YOU BOUGHT JUST DIDN'T LAST AND YOU DIDN'T HAVE MONEY TO GET MORE.: NEVER TRUE

## 2022-02-18 SDOH — ECONOMIC STABILITY: FOOD INSECURITY: WITHIN THE PAST 12 MONTHS, YOU WORRIED THAT YOUR FOOD WOULD RUN OUT BEFORE YOU GOT MONEY TO BUY MORE.: NEVER TRUE

## 2022-02-18 SDOH — HEALTH STABILITY: PHYSICAL HEALTH: ON AVERAGE, HOW MANY MINUTES DO YOU ENGAGE IN EXERCISE AT THIS LEVEL?: 80 MIN

## 2022-02-18 ASSESSMENT — LIFESTYLE VARIABLES
HOW OFTEN DO YOU HAVE A DRINK CONTAINING ALCOHOL: 4 OR MORE TIMES A WEEK
HOW MANY STANDARD DRINKS CONTAINING ALCOHOL DO YOU HAVE ON A TYPICAL DAY: 1 OR 2

## 2022-02-18 ASSESSMENT — PATIENT HEALTH QUESTIONNAIRE - PHQ9
SUM OF ALL RESPONSES TO PHQ QUESTIONS 1-9: 0
SUM OF ALL RESPONSES TO PHQ9 QUESTIONS 1 & 2: 0
SUM OF ALL RESPONSES TO PHQ QUESTIONS 1-9: 0
10. IF YOU CHECKED OFF ANY PROBLEMS, HOW DIFFICULT HAVE THESE PROBLEMS MADE IT FOR YOU TO DO YOUR WORK, TAKE CARE OF THINGS AT HOME, OR GET ALONG WITH OTHER PEOPLE: 0
4. FEELING TIRED OR HAVING LITTLE ENERGY: 0
5. POOR APPETITE OR OVEREATING: 0
9. THOUGHTS THAT YOU WOULD BE BETTER OFF DEAD, OR OF HURTING YOURSELF: 0
SUM OF ALL RESPONSES TO PHQ QUESTIONS 1-9: 0
8. MOVING OR SPEAKING SO SLOWLY THAT OTHER PEOPLE COULD HAVE NOTICED. OR THE OPPOSITE, BEING SO FIGETY OR RESTLESS THAT YOU HAVE BEEN MOVING AROUND A LOT MORE THAN USUAL: 0
3. TROUBLE FALLING OR STAYING ASLEEP: 0
SUM OF ALL RESPONSES TO PHQ QUESTIONS 1-9: 0
6. FEELING BAD ABOUT YOURSELF - OR THAT YOU ARE A FAILURE OR HAVE LET YOURSELF OR YOUR FAMILY DOWN: 0
1. LITTLE INTEREST OR PLEASURE IN DOING THINGS: 0
2. FEELING DOWN, DEPRESSED OR HOPELESS: 0
7. TROUBLE CONCENTRATING ON THINGS, SUCH AS READING THE NEWSPAPER OR WATCHING TELEVISION: 0

## 2022-02-18 ASSESSMENT — ENCOUNTER SYMPTOMS
RESPIRATORY NEGATIVE: 1
GASTROINTESTINAL NEGATIVE: 1

## 2022-02-18 ASSESSMENT — SOCIAL DETERMINANTS OF HEALTH (SDOH): HOW HARD IS IT FOR YOU TO PAY FOR THE VERY BASICS LIKE FOOD, HOUSING, MEDICAL CARE, AND HEATING?: NOT HARD AT ALL

## 2022-02-18 NOTE — PATIENT INSTRUCTIONS
Recommendations for optimal health:  Be sure you are exercising at least 30 minutes  or 10,000 steps daily. Ideally you should try to get a mix of cardio and strength exercises. Work on [employer] For more detailed information, visit Nutrition Source web site- knowledge for healthy eating from [address] [place] [address]. [facility]      If your are using supplements, look for \"USP verified\" on the label. Helps to assure they are good quality. Vitamin D 800 units daily. Calcium intake - try for 800-1200 mg of calcium in combination of diet and supplements. You can read on this in much more detail on nutritionLiving [place]. org    8 Nutrition Tips for Eating Right:  1. Choose good carbs, not no carbs. Whole grains are your best bet. 2. Pay attention to the protein package. Fish, poultry, nuts, and beans are the best choices. 3. Choose foods with healthy fats, limit foods high in saturated fat, and avoid foods with trans fat. Plant oils, nuts, and fish are the healthiest sources. 4. Choose a fiber-filled diet, rich in whole grains, vegetables, and fruits. 5. Eat more vegetables and fruits. Go for color and varietydark green, yellow, orange, and red. 6. Calcium is important. But milk isnt the only, or even best, source. 7. Water is best to quench your thirst. Skip the sugary drinks, and go easy on the milk and juice. 8. Eating less salt is good for everyones health. Choose more fresh foods and fewer processed foods. Aim for 2-3 cups of vegetables daily and 1 1/2-2 cups of fruits daily.

## 2022-02-18 NOTE — PROGRESS NOTES
ASSESSMENT/PLAN:   Wellness exam  Discussed age appropriate preventive care including healthy diet, daily exercise, immunizations and age & gender guided screening tests. Wellness examination  Agatston coronary artery calcium score between 100 and 400  Assessment & Plan:  Asymptomatic. Continue asa and statin. Elevated AST (SGOT)  Comments:  Patient recently cut down on alcohol. We will continue to monitor  Mixed hyperlipidemia  Assessment & Plan:   Well-controlled, Continue atorvastatin at same dose      Return in about 1 year (around 2023). Ruben Montana (:  1965) is a 60 y.o. male, here for annual preventive visit. Exercise: Joined [employer] in January. Using an keith called [employer] for workouts. Diet: Using an keith now for food tracking. Eating better, cooking more. Gets sharp pain in left leg when going up stairs if carrying extra weight, like camera bag. Not every time. Random.      Patient Care Team:  Paulino Gamino MD as PCP - General (Internal Medicine)  Paulino Gamino MD as PCP - [facility] Empaneled Provider  Whitley Meléndez MD as Consulting Physician (Dermatology)    Health Maintenance   Topic Date Due    Lipid screen  02/15/2023    Depression Screen  2023    DTaP/Tdap/Td vaccine (2 - Td or Tdap) 2024    Diabetes screen  02/15/2025    Colorectal Cancer Screen  2025    Flu vaccine  Completed    Shingles Vaccine  Completed    COVID-19 Vaccine  Completed    Hepatitis C screen  Completed    Hepatitis A vaccine  Aged Out    Hepatitis B vaccine  Aged Out    Hib vaccine  Aged Out    Meningococcal (ACWY) vaccine  Aged Out    Pneumococcal 0-64 years Vaccine  Aged Out    HIV screen  Discontinued     Immunization History   Administered Date(s) Administered    COVID-19, David Rabago, Primary or Immunocompromised, PF, 100mcg/0.5mL 2021, 04/15/2021, 2021    Influenza Virus Vaccine 10/09/2020    Influenza, MDCK Quadv, IM, PF (Flucelvax 2 yrs and older) 10/09/2020    Influenza, Aidan Braun, IM, PF (6 mo and older Fluzone, Flulaval, Fluarix, and 3 yrs and older Afluria) 2018, 2020    Tdap (Boostrix, Adacel) 2014    Zoster Recombinant (Shingrix) 2021, 2021       Past Medical History:   Diagnosis Date    Agatston coronary artery calcium score between 100 and 400 2022    Environmental allergies     History of depression     in family    Personal history of COVID-19 2021    Scoliosis     as teen       Outpatient Medications Marked as Taking for the 22 encounter (Office Visit) with Radha Stallworth MD   Medication Sig Dispense Refill    VITAMIN D PO Take by mouth      Turmeric (QC TUMERIC COMPLEX PO) Take by mouth      atorvastatin (LIPITOR) 40 MG tablet Take 1 tablet by mouth daily 90 tablet 1    aspirin EC 81 MG EC tablet Take 1 tablet by mouth daily 90 tablet 1    fluticasone (FLONASE) 50 MCG/ACT nasal spray SPRAY 2 SPRAYS BY NASAL ROUTE DAILY 16 g 5    vitamin C (ASCORBIC ACID) 500 MG tablet Take 1,000 mg by mouth daily          No Known Allergies    Past Surgical History:   Procedure Laterality Date    NASAL SEPTUM SURGERY      TONSILLECTOMY         Social History     Tobacco Use    Smoking status: Former Smoker     Packs/day: 0.25     Years: 5.00     Pack years: 1.25     Types: Cigarettes     Quit date: 1992     Years since quittin.1    Smokeless tobacco: Never Used    Tobacco comment: Quit 20 years ago - social smoker when I was younger   Vaping Use    Vaping Use: Never used   Substance Use Topics    Alcohol use:  Yes     Alcohol/week: 20.0 standard drinks     Types: 5 Glasses of wine, 5 Cans of beer, 10 Standard drinks or equivalent per week    Drug use: No        Family History   Problem Relation Age of Onset    Depression Mother     Alzheimer's Disease Mother 80    Coronary Art Dis Father 76        stents and bypass    Other Sister         anxiety and depression    oriented to person, place, and time. No flowsheet data found. Lab Results   Component Value Date    CHOL 146 06/01/2021    CHOLFAST 141 02/15/2022    TRIG 89 06/01/2021    TRIGLYCFAST 120 02/15/2022    HDL 52 02/15/2022    LDLCALC 65 02/15/2022    GLUF 104 02/15/2022    GLUCOSE 101 02/15/2021    LABA1C 5.5 02/15/2022       The 10-year ASCVD risk score (Celso Black, et al., 2013) is: 4.6%    Values used to calculate the score:      Age: 60 years      Sex: Male      Is Non- : No      Diabetic: No      Tobacco smoker: No      Systolic Blood Pressure: 244 mmHg      Is BP treated: No      HDL Cholesterol: 52 mg/dL      Total Cholesterol: 141 mg/dL      An electronic signature was used to authenticate this note.     --Radha Stallworth MD

## 2022-04-28 ENCOUNTER — PATIENT MESSAGE (OUTPATIENT)
Dept: INTERNAL MEDICINE CLINIC | Age: 57
End: 2022-04-28

## 2022-04-28 NOTE — TELEPHONE ENCOUNTER
Last appointment: 2/18/2022  Next appointment: Visit date not found  Last refill: 7/9/2020  Appointment not due for >6 months.

## 2022-04-28 NOTE — TELEPHONE ENCOUNTER
From: Samantha Lamar  To: Dr. Lidya Arevalo  Sent: 4/28/2022 10:47 AM EDT  Subject: Refill request    I am out of Halobetasol Propionate Cream that I use for minor rash flare ups on my palms. Assuming yes, can this be sent to my [facility] ([address]) instead of [facility]?    Thank you!

## 2022-07-26 ENCOUNTER — PATIENT MESSAGE (OUTPATIENT)
Dept: INTERNAL MEDICINE CLINIC | Age: 57
End: 2022-07-26

## 2022-07-26 NOTE — TELEPHONE ENCOUNTER
From: Beth Arevalo  To: Dr. Douglas Hernandez  Sent: 2022 3:53 PM EDT  Subject: Annual Checkup    Hi! Can you please tell me when I need to schedule the followin. My next annual visit  2. I think I need a second shingles shot? Thank you!     Beth Arevalo

## 2022-08-17 RX ORDER — ATORVASTATIN CALCIUM 40 MG/1
TABLET, FILM COATED ORAL
Qty: 90 TABLET | Refills: 1 | Status: SHIPPED | OUTPATIENT
Start: 2022-08-17

## 2022-08-23 ENCOUNTER — NURSE ONLY (OUTPATIENT)
Dept: INTERNAL MEDICINE CLINIC | Age: 57
End: 2022-08-23
Payer: COMMERCIAL

## 2022-08-23 DIAGNOSIS — Z23 NEED FOR SHINGLES VACCINE: Primary | ICD-10-CM

## 2022-08-23 PROCEDURE — 90750 HZV VACC RECOMBINANT IM: CPT | Performed by: INTERNAL MEDICINE

## 2022-08-23 PROCEDURE — 90471 IMMUNIZATION ADMIN: CPT | Performed by: INTERNAL MEDICINE

## 2023-02-12 ENCOUNTER — PATIENT MESSAGE (OUTPATIENT)
Dept: INTERNAL MEDICINE CLINIC | Age: 58
End: 2023-02-12

## 2023-02-12 DIAGNOSIS — Z12.5 PROSTATE CANCER SCREENING: Primary | ICD-10-CM

## 2023-02-12 DIAGNOSIS — Z13.220 LIPID SCREENING: ICD-10-CM

## 2023-02-12 DIAGNOSIS — Z13.1 SCREENING FOR DIABETES MELLITUS: ICD-10-CM

## 2023-02-13 NOTE — TELEPHONE ENCOUNTER
From: Cherelle Gamboa  To: Dr. Denise Franklin  Sent: 2/12/2023 2:26 PM EST  Subject: Blood work prior to my annual physical    Is it possible I can come in on Weds 2/15 early afternoon to have my bloodwork down so the results are ready for my annual physical scheduled for 2/22? Thanks!

## 2023-02-15 DIAGNOSIS — Z13.220 LIPID SCREENING: ICD-10-CM

## 2023-02-15 DIAGNOSIS — Z13.1 SCREENING FOR DIABETES MELLITUS: ICD-10-CM

## 2023-02-15 DIAGNOSIS — Z12.5 PROSTATE CANCER SCREENING: ICD-10-CM

## 2023-02-15 LAB
A/G RATIO: 1.4 (ref 1.1–2.2)
ALBUMIN SERPL-MCNC: 4.1 G/DL (ref 3.4–5)
ALP BLD-CCNC: 89 U/L (ref 40–129)
ALT SERPL-CCNC: 29 U/L (ref 10–40)
ANION GAP SERPL CALCULATED.3IONS-SCNC: 10 MMOL/L (ref 3–16)
AST SERPL-CCNC: 22 U/L (ref 15–37)
BILIRUB SERPL-MCNC: 0.6 MG/DL (ref 0–1)
BUN BLDV-MCNC: 11 MG/DL (ref 7–20)
CALCIUM SERPL-MCNC: 9.3 MG/DL (ref 8.3–10.6)
CHLORIDE BLD-SCNC: 102 MMOL/L (ref 99–110)
CHOLESTEROL, TOTAL: 137 MG/DL (ref 0–199)
CO2: 26 MMOL/L (ref 21–32)
CREAT SERPL-MCNC: 0.8 MG/DL (ref 0.9–1.3)
ESTIMATED AVERAGE GLUCOSE: 111.2 MG/DL
GFR SERPL CREATININE-BSD FRML MDRD: >60 ML/MIN/{1.73_M2}
GLUCOSE BLD-MCNC: 97 MG/DL (ref 70–99)
HBA1C MFR BLD: 5.5 %
HDLC SERPL-MCNC: 44 MG/DL (ref 40–60)
LDL CHOLESTEROL CALCULATED: 70 MG/DL
POTASSIUM SERPL-SCNC: 4.1 MMOL/L (ref 3.5–5.1)
PROSTATE SPECIFIC ANTIGEN: 0.59 NG/ML (ref 0–4)
SODIUM BLD-SCNC: 138 MMOL/L (ref 136–145)
TOTAL PROTEIN: 7 G/DL (ref 6.4–8.2)
TRIGL SERPL-MCNC: 114 MG/DL (ref 0–150)
VLDLC SERPL CALC-MCNC: 23 MG/DL

## 2023-02-22 ENCOUNTER — OFFICE VISIT (OUTPATIENT)
Dept: INTERNAL MEDICINE CLINIC | Age: 58
End: 2023-02-22
Payer: COMMERCIAL

## 2023-02-22 VITALS
DIASTOLIC BLOOD PRESSURE: 82 MMHG | OXYGEN SATURATION: 98 % | WEIGHT: 186.2 LBS | TEMPERATURE: 97.6 F | HEART RATE: 72 BPM | BODY MASS INDEX: 29.92 KG/M2 | HEIGHT: 66 IN | SYSTOLIC BLOOD PRESSURE: 128 MMHG

## 2023-02-22 DIAGNOSIS — M54.50 LEFT-SIDED LOW BACK PAIN WITHOUT SCIATICA, UNSPECIFIED CHRONICITY: ICD-10-CM

## 2023-02-22 DIAGNOSIS — R41.840 IMPAIRED CONCENTRATION: ICD-10-CM

## 2023-02-22 DIAGNOSIS — Z00.00 WELL ADULT EXAM: Primary | ICD-10-CM

## 2023-02-22 PROCEDURE — G8484 FLU IMMUNIZE NO ADMIN: HCPCS | Performed by: INTERNAL MEDICINE

## 2023-02-22 PROCEDURE — 99396 PREV VISIT EST AGE 40-64: CPT | Performed by: INTERNAL MEDICINE

## 2023-02-22 RX ORDER — LORATADINE 10 MG/1
10 TABLET ORAL DAILY
COMMUNITY

## 2023-02-22 SDOH — ECONOMIC STABILITY: FOOD INSECURITY: WITHIN THE PAST 12 MONTHS, YOU WORRIED THAT YOUR FOOD WOULD RUN OUT BEFORE YOU GOT MONEY TO BUY MORE.: NEVER TRUE

## 2023-02-22 SDOH — ECONOMIC STABILITY: FOOD INSECURITY: WITHIN THE PAST 12 MONTHS, THE FOOD YOU BOUGHT JUST DIDN'T LAST AND YOU DIDN'T HAVE MONEY TO GET MORE.: NEVER TRUE

## 2023-02-22 SDOH — ECONOMIC STABILITY: HOUSING INSECURITY
IN THE LAST 12 MONTHS, WAS THERE A TIME WHEN YOU DID NOT HAVE A STEADY PLACE TO SLEEP OR SLEPT IN A SHELTER (INCLUDING NOW)?: NO

## 2023-02-22 SDOH — ECONOMIC STABILITY: INCOME INSECURITY: HOW HARD IS IT FOR YOU TO PAY FOR THE VERY BASICS LIKE FOOD, HOUSING, MEDICAL CARE, AND HEATING?: NOT HARD AT ALL

## 2023-02-22 ASSESSMENT — PATIENT HEALTH QUESTIONNAIRE - PHQ9
SUM OF ALL RESPONSES TO PHQ QUESTIONS 1-9: 0
SUM OF ALL RESPONSES TO PHQ9 QUESTIONS 1 & 2: 0
1. LITTLE INTEREST OR PLEASURE IN DOING THINGS: 0
SUM OF ALL RESPONSES TO PHQ QUESTIONS 1-9: 0
2. FEELING DOWN, DEPRESSED OR HOPELESS: 0
SUM OF ALL RESPONSES TO PHQ QUESTIONS 1-9: 0
SUM OF ALL RESPONSES TO PHQ QUESTIONS 1-9: 0

## 2023-02-22 NOTE — PATIENT INSTRUCTIONS
Consider seeing out psychologist, Dr. Juan Daniel Osborne. Recommendations for optimal health:  Be sure you are exercising at least 30 minutes  or 10,000 steps daily. Ideally you should try to get a mix of cardio and strength exercises. Work on [employer] For more detailed information, visit Nutrition Source web site- knowledge for healthy eating from [address] [place] [address]. [facility]      If your are using supplements, look for \"USP verified\" on the label. Helps to assure they are good quality. Vitamin D 800 units daily. Calcium intake - try for 800-1200 mg of calcium in combination of diet and supplements. You can read on this in much more detail on nutritionProsodice. org    8 Nutrition Tips for Eating Right:  1. Choose good carbs, not no carbs. Whole grains are your best bet. 2. Pay attention to the protein package. Fish, poultry, nuts, and beans are the best choices. 3. Choose foods with healthy fats, limit foods high in saturated fat, and avoid foods with trans fat. Plant oils, nuts, and fish are the healthiest sources. 4. Choose a fiber-filled diet, rich in whole grains, vegetables, and fruits. 5. Eat more vegetables and fruits. Go for color and variety--dark green, yellow, orange, and red. 6. Calcium is important. But milk isnt the only, or even best, source. 7. Water is best to quench your thirst. Skip the sugary drinks, and go easy on the milk and juice. 8. Eating less salt is good for everyones health. Choose more fresh foods and fewer processed foods. Aim for 2-3 cups of vegetables daily and 1 1/2-2 cups of fruits daily.

## 2023-02-22 NOTE — PROGRESS NOTES
ASSESSMENT/PLAN:   Wellness exam  Discussed age appropriate preventive care including healthy diet, daily exercise, immunizations and age & gender guided screening tests. Well adult exam  Left-sided low back pain without sciatica, unspecified chronicity  Comments:  Also with scoliosis. Refer to PT. Consider if spine referral if no improvement with PT. Orders:  -     Ambulatory referral to Physical Therapy  Impaired concentration  Comments:  Not sure if primary issue or secondary to anxiety. Encourage formal eval with psychologist, and/or coping strategies with psych. Return in about 6 months (around 2023). Nelson Tate (:  1965) is a 59 y.o. male, here for annual preventive visit. Wonders if restless leg. Intermittently, 2-3 nights at a time, will get aching and cramping in low back into hip and lower leg and foot. Happens a few times/months, and not always in bed. 2 aleve will help but not one. Happ[place] Also mentions history of scoliosis. Pillow between the legs helps in general but not when this is bothering. Has  occasionally had the leg give out. Has noticed not snoring as much since started using 2 pillows recently, and leg hasn't bothered either. Did PT for back as child, not since. Knot under left foot. Should he be concerned? Doesn't;t hurt most of the time. Worried that he has had to bad colds since [place]. Frustrated that he can't lose weight. Greatly reduced alcohol intake. Goes to gym and wife cooking well for them. Concentration progressively worse. Not socializing much, embracing his introvert. Family thinks he has a lot of anxiety and he is now acknowledging. Not interested in medication for anxiety. Doesn't feel like it interferes with is functioning. Overall is very happy.   Looking for coping skills, wants to be sure not something more serious like early dementia    Exercise: walks most days in warmer weather , body weight work at home.    Diet: tries to be healthy, feels very reasonable    Patient Care Team:  Kirsty Kirkland MD as PCP - General (Internal Medicine)  Kirsty Kirkland MD as PCP - Empaneled Provider  Merlin Moeller MD as Consulting Physician (Dermatology)    Health Maintenance   Topic Date Due    Lipids  02/15/2024    Depression Screen  02/22/2024    DTaP/Tdap/Td vaccine (2 - Td or Tdap) 05/30/2024    Colorectal Cancer Screen  02/20/2025    Flu vaccine  Completed    Shingles vaccine  Completed    COVID-19 Vaccine  Completed    Hepatitis C screen  Completed    Hepatitis A vaccine  Aged Out    Hib vaccine  Aged Out    Meningococcal (ACWY) vaccine  Aged Out    Pneumococcal 0-64 years Vaccine  Aged Out    HIV screen  Discontinued     Immunization History   Administered Date(s) Administered    COVID-19, MODERNA BLUE border, Primary or Immunocompromised, (age 12y+), IM, 100 mcg/0.5mL 03/18/2021, 04/15/2021, 11/06/2021, 11/27/2021, 06/27/2022    COVID-19, MODERNA Booster BLUE border, (age 18y+), IM, 50mcg/0.25mL 10/11/2022    Influenza Virus Vaccine 10/09/2020, 10/11/2022    Influenza, FLUARIX, FLULAVAL, FLUZONE (age 10 mo+) AND AFLURIA, (age 1 y+), PF, 0.5mL 12/11/2018, 02/06/2020    Influenza, FLUCELVAX, (age 10 mo+), MDCK, PF, 0.5mL 10/09/2020    Tdap (Boostrix, Adacel) 05/30/2014    Zoster Recombinant (Shingrix) 06/01/2021, 12/01/2021, 08/23/2022       Past Medical History:   Diagnosis Date    Agatston coronary artery calcium score between 100 and 400 2/18/2022    Environmental allergies     History of depression     in family    Personal history of COVID-19 2/9/2021 1/20/2021    Scoliosis     as teen       Outpatient Medications Marked as Taking for the 2/22/23 encounter (Office Visit) with Kirsty Kirkland MD   Medication Sig Dispense Refill    loratadine (CLARITIN) 10 MG tablet Take 10 mg by mouth daily      atorvastatin (LIPITOR) 40 MG tablet TAKE ONE TABLET BY MOUTH DAILY 90 tablet 1    VITAMIN D PO Take by mouth aspirin EC 81 MG EC tablet Take 1 tablet by mouth daily 90 tablet 1    fluticasone (FLONASE) 50 MCG/ACT nasal spray SPRAY 2 SPRAYS BY NASAL ROUTE DAILY 16 g 5        No Known Allergies    Past Surgical History:   Procedure Laterality Date    NASAL SEPTUM SURGERY      TONSILLECTOMY         Social History     Tobacco Use    Smoking status: Former     Packs/day: 0.25     Years: 5.00     Pack years: 1.25     Types: Cigarettes     Quit date: 1992     Years since quittin.1    Smokeless tobacco: Never    Tobacco comments:     Quit 20 years ago - social smoker when I was younger   Vaping Use    Vaping Use: Never used   Substance Use Topics    Alcohol use: Yes     Alcohol/week: 5.0 standard drinks     Types: 5 Standard drinks or equivalent per week    Drug use: No        Family History   Problem Relation Age of Onset    Depression Mother     Alzheimer's Disease Mother 80    Coronary Art Dis Father 76        stents and bypass    Other Sister         anxiety and depression    Hypertension Sister     Other Sister         MS    Alzheimer's Disease Maternal Grandmother     ADHD Daughter     Alzheimer's Disease Maternal Aunt      Review of Systems   Constitutional: Negative. Respiratory: Negative. Cardiovascular: Negative. Gastrointestinal: Negative. Endocrine: Negative. Musculoskeletal:  Positive for back pain. Knot under left foot   Neurological: Negative. Hematological: Negative. Psychiatric/Behavioral:  Positive for decreased concentration. The patient is nervous/anxious.       Wt Readings from Last 5 Encounters:   23 186 lb 3.2 oz (84.5 kg)   22 185 lb 9.6 oz (84.2 kg)   21 179 lb (81.2 kg)   11/10/20 179 lb (81.2 kg)   20 178 lb (80.7 kg)     Vitals:    23 1417   BP: 128/82   Pulse: 72   Temp: 97.6 °F (36.4 °C)   TempSrc: Temporal   SpO2: 98%   Weight: 186 lb 3.2 oz (84.5 kg)   Height: 5' 5.5\" (1.664 m)     Estimated body mass index is 30.51 kg/m² as calculated from the following:    Height as of this encounter: 5' 5.5\" (1.664 m). Weight as of this encounter: 186 lb 3.2 oz (84.5 kg). Physical Exam  Constitutional:       Appearance: Normal appearance. He is well-developed. HENT:      Head: Normocephalic and atraumatic. Right Ear: Tympanic membrane and ear canal normal.      Left Ear: Tympanic membrane and ear canal normal.   Eyes:      Conjunctiva/sclera: Conjunctivae normal.      Pupils: Pupils are equal, round, and reactive to light. Neck:      Thyroid: No thyromegaly. Vascular: No carotid bruit. Cardiovascular:      Rate and Rhythm: Normal rate and regular rhythm. Pulses: Normal pulses. Heart sounds: Normal heart sounds. No murmur heard. Pulmonary:      Effort: Pulmonary effort is normal.      Breath sounds: Normal breath sounds. Abdominal:      General: Bowel sounds are normal.      Palpations: Abdomen is soft. There is no mass. Tenderness: There is no abdominal tenderness. Musculoskeletal:      Right lower leg: No edema. Left lower leg: No edema. Lymphadenopathy:      Cervical: No cervical adenopathy. Skin:     Coloration: Skin is not pale. Findings: No rash. Comments: No suspicious lesions   Neurological:      General: No focal deficit present. Mental Status: He is alert. Psychiatric:      Comments: Slightly anxious       No flowsheet data found.     Lab Results   Component Value Date/Time    CHOL 137 02/15/2023 08:51 AM    CHOLFAST 141 02/15/2022 09:32 AM    TRIG 114 02/15/2023 08:51 AM    TRIGLYCFAST 120 02/15/2022 09:32 AM    HDL 44 02/15/2023 08:51 AM    LDLCALC 70 02/15/2023 08:51 AM    GLUF 104 02/15/2022 09:32 AM    GLUCOSE 97 02/15/2023 08:51 AM    LABA1C 5.5 02/15/2023 08:51 AM       The 10-year ASCVD risk score (Lucretia KOCH, et al., 2019) is: 6.1%    Values used to calculate the score:      Age: 59 years      Sex: Male      Is Non- : No      Diabetic: No Tobacco smoker: No      Systolic Blood Pressure: 452 mmHg      Is BP treated: No      HDL Cholesterol: 44 mg/dL      Total Cholesterol: 137 mg/dL      An electronic signature was used to authenticate this note.     --Anthony Espinoza MD

## 2023-02-27 ASSESSMENT — ENCOUNTER SYMPTOMS
GASTROINTESTINAL NEGATIVE: 1
RESPIRATORY NEGATIVE: 1
BACK PAIN: 1

## 2023-02-28 ENCOUNTER — PATIENT MESSAGE (OUTPATIENT)
Dept: INTERNAL MEDICINE CLINIC | Age: 58
End: 2023-02-28

## 2023-02-28 RX ORDER — ATORVASTATIN CALCIUM 40 MG/1
TABLET, FILM COATED ORAL
Qty: 90 TABLET | Refills: 1 | Status: SHIPPED | OUTPATIENT
Start: 2023-02-28

## 2023-02-28 NOTE — TELEPHONE ENCOUNTER
LOV 2/22/23  NOV none  Last filled # 90 with 1 refill 8/17/22    Units 2/15/23 0851 2/15/22 0932 6/1/21 0932 2/15/21 1050 1/31/20 1327 12/11/18 1121 8/2/17 1436    Cholesterol, Total 0 - 199 mg/dL 137   146  204 High        Triglycerides 0 - 150 mg/dL 114   89  162 High        HDL 40 - 60 mg/dL 44  52  49  46  59  54  51    Comment: An HDL cholesterol less than 40 mg/dL is low and   constitutes a coronary heart disease risk factor. An HDL cholesterol greater than 60 mg/dL is a   negative risk factor for coronary heart disease.     LDL Calculated <100 mg/dL 70  65  79  126 High   132 High   136 High   128 High     VLDL Cholesterol Calculated Not Established mg/dL 23  24  18  32  28  28  38    Cholesterol, Fasting   141 R    219 High  R  218 High  R  217 High  R    Triglyceride, Fasting   120 R    138 R  140 R  188 High  R

## 2023-02-28 NOTE — TELEPHONE ENCOUNTER
From: Sivan Flores  To: Dr. Baron Kelley  Sent: 2/28/2023 2:40 PM EST  Subject: PT referral    Hello -   On my latest annual visit, it was recommended I look into going for PT for some back/leg issues. While the visit summary does say Ambulatory referral to Physical Therapy, there was a written referral to someone who Dr. Burdette Klinefelter recommended. Could you please provide me their name and number here as I believe the paper was thrown out once the My Chart record was available (thinking it was on there). Thank you!

## 2023-04-18 ENCOUNTER — OFFICE VISIT (OUTPATIENT)
Dept: PSYCHOLOGY | Age: 58
End: 2023-04-18

## 2023-04-18 DIAGNOSIS — F41.9 ANXIETY: Primary | ICD-10-CM

## 2023-04-18 DIAGNOSIS — R41.840 IMPAIRED CONCENTRATION: ICD-10-CM

## 2023-04-18 ASSESSMENT — PATIENT HEALTH QUESTIONNAIRE - PHQ9
2. FEELING DOWN, DEPRESSED OR HOPELESS: 2
9. THOUGHTS THAT YOU WOULD BE BETTER OFF DEAD, OR OF HURTING YOURSELF: 1
8. MOVING OR SPEAKING SO SLOWLY THAT OTHER PEOPLE COULD HAVE NOTICED. OR THE OPPOSITE, BEING SO FIGETY OR RESTLESS THAT YOU HAVE BEEN MOVING AROUND A LOT MORE THAN USUAL: 2
SUM OF ALL RESPONSES TO PHQ QUESTIONS 1-9: 16
3. TROUBLE FALLING OR STAYING ASLEEP: 3
7. TROUBLE CONCENTRATING ON THINGS, SUCH AS READING THE NEWSPAPER OR WATCHING TELEVISION: 3
4. FEELING TIRED OR HAVING LITTLE ENERGY: 1
1. LITTLE INTEREST OR PLEASURE IN DOING THINGS: 0
6. FEELING BAD ABOUT YOURSELF - OR THAT YOU ARE A FAILURE OR HAVE LET YOURSELF OR YOUR FAMILY DOWN: 3
SUM OF ALL RESPONSES TO PHQ QUESTIONS 1-9: 16
SUM OF ALL RESPONSES TO PHQ9 QUESTIONS 1 & 2: 2
SUM OF ALL RESPONSES TO PHQ QUESTIONS 1-9: 16
SUM OF ALL RESPONSES TO PHQ QUESTIONS 1-9: 15
5. POOR APPETITE OR OVEREATING: 1
10. IF YOU CHECKED OFF ANY PROBLEMS, HOW DIFFICULT HAVE THESE PROBLEMS MADE IT FOR YOU TO DO YOUR WORK, TAKE CARE OF THINGS AT HOME, OR GET ALONG WITH OTHER PEOPLE: 1

## 2023-04-18 ASSESSMENT — ANXIETY QUESTIONNAIRES
2. NOT BEING ABLE TO STOP OR CONTROL WORRYING: 2
6. BECOMING EASILY ANNOYED OR IRRITABLE: 1
5. BEING SO RESTLESS THAT IT IS HARD TO SIT STILL: 3
IF YOU CHECKED OFF ANY PROBLEMS ON THIS QUESTIONNAIRE, HOW DIFFICULT HAVE THESE PROBLEMS MADE IT FOR YOU TO DO YOUR WORK, TAKE CARE OF THINGS AT HOME, OR GET ALONG WITH OTHER PEOPLE: SOMEWHAT DIFFICULT
3. WORRYING TOO MUCH ABOUT DIFFERENT THINGS: 1
4. TROUBLE RELAXING: 3
GAD7 TOTAL SCORE: 13
1. FEELING NERVOUS, ANXIOUS, OR ON EDGE: 1
7. FEELING AFRAID AS IF SOMETHING AWFUL MIGHT HAPPEN: 2

## 2023-04-18 NOTE — PROGRESS NOTES
alcohol use (apprx seven drinks/week), has taken THC gummies at night for leg pain. Denied current use. There is inconsistent exercise. Patient reported awakening at 5 or 6am. Denied issues initiating sleep. Patient enjoys the following hobbies: photography, creating, being outside, hiking. He describes good social support from wife. Patient does not identify with specific Christianity/culture. Familial MH history is positive for ADHD (biological children), suicide attempts (biological mother and sister), anxiety/depression (biological mother), anxiety (biological sisters). Mental Health History  Psychotropic medications: none  Psychiatric hospitalizations: denied  Suicidal ideation/homicidal ideation: denied; previous thoughts if he had a gun he would use it; reported recent thoughts of \"getting off the grid\" that he described as a \"fantasy\"; denied active suicidal ideation, plan, intent; denied homicidal ideation; denied having firearms in the home  Suicide attempts: denied  Previous outpatient treatment: previous therapy as a child, teenager, young adult; denied previous psychiatry services     Discussed safety interventions to deal with suicidal thoughts or if suicidal thoughts worsen including going to ER, calling 911, calling crisis hotlines.      O:  MSE:    Attitude: cooperative and friendly  Consciousness: alert  Orientation: oriented to person, place, time, general circumstance  Memory: recent and remote memory intact  Attention/Concentration: intact during session  Speech: normal rate and volume, well-articulated  Mood: \"okay\"  Affect:  mildly anxious  Perception: within normal limits  Thought Content:  some anxious content  Thought Process: logical, coherent and goal-directed  Insight: good  Judgment: intact  Ability to understand instructions: Yes  Ability to respond meaningfully: Yes  Morbid Ideation: no   Suicide Assessment: no suicidal ideation, plan, or intent  Homicidal Ideation:

## 2023-04-18 NOTE — PATIENT INSTRUCTIONS
[address]  Phone: [phone]  [employer]     [address]  They will try to run your insurance  Often times self-pay and can be expensive, so ask about cost  [address] [place], [address] [facility]  Phone: [phone]  [email or web address]  [employer] and [employer] of evaluation is not covered by insurance: $250.00  Other portion may be able to be billed to insurance  [address], [place], [address] [place] [address]  [phone]  [email or web address]  Chito Rowell, Ph.D.  Carolyne Yaniras, and may also accept other insurances  [facility] [facility], [facility], [address] [place] [address]  [phone]  [email or web address]  Bronson Client, Ph.D.  [facility], [address] [facility] [place] [address] [phone]  [email or web address]

## 2023-05-04 ENCOUNTER — TELEMEDICINE (OUTPATIENT)
Dept: PSYCHOLOGY | Age: 58
End: 2023-05-04
Payer: COMMERCIAL

## 2023-05-04 DIAGNOSIS — R41.840 IMPAIRED CONCENTRATION: ICD-10-CM

## 2023-05-04 DIAGNOSIS — F41.9 ANXIETY: Primary | ICD-10-CM

## 2023-05-04 PROCEDURE — 90832 PSYTX W PT 30 MINUTES: CPT

## 2023-05-23 ENCOUNTER — TELEMEDICINE (OUTPATIENT)
Dept: PSYCHOLOGY | Age: 58
End: 2023-05-23
Payer: COMMERCIAL

## 2023-05-23 DIAGNOSIS — R41.840 IMPAIRED CONCENTRATION: ICD-10-CM

## 2023-05-23 DIAGNOSIS — F41.9 ANXIETY: Primary | ICD-10-CM

## 2023-05-23 PROCEDURE — 90832 PSYTX W PT 30 MINUTES: CPT

## 2023-05-23 PROCEDURE — 1036F TOBACCO NON-USER: CPT

## 2023-05-23 NOTE — PATIENT INSTRUCTIONS
Return to see Dr. Chin Camp in 2-4 weeks  Practice passive progressive muscle relaxation exercises   Book recommendation: Self-Compassion: The Proven Power of Being Kind to Yourself by Dr. Juanita Lainez   Read and practice self-compassion exercises below  Begin to practice cognitive restructuring exercises  Call PC office if mood significantly worsens    Self-Compassion    Definition of Self-Compassion:    Having compassion for oneself is really no different than having compassion for others. Think about what the experience of compassion feels like. First, to have compassion for others you must notice that they are suffering. If you ignore that homeless person on the street, you cant feel compassion for how difficult his or her experience is. Second, compassion involves feeling moved by others suffering so that your heart responds to their pain (the word compassion literally means to suffer with). When this occurs, you feel warmth, caring, and the desire to help the suffering person in some way. Having compassion also means that you offer understanding and kindness to others when they fail or make mistakes, rather than judging them harshly. Finally, when you feel compassion for another (rather than mere pity), it means that you realize that suffering, failure, and imperfection is part of the shared human experience. There but for fortune go I.    Self-compassion involves acting the same way towards yourself when you are having a difficult time, fail, or notice something you dont like about yourself. Instead of just ignoring your pain with a stiff upper lip mentality, you stop to tell yourself this is really difficult right now, how can I comfort and care for myself in this moment?     Instead of mercilessly judging and criticizing yourself for various inadequacies or shortcomings, self-compassion means you are kind and understanding when confronted with personal failings - after all, who ever said you were

## 2023-05-23 NOTE — PROGRESS NOTES
Behavioral Health Consultation  GUTIERREZ VILLALOBOS Psy.D. Psychologist  5/23/2023  9:28 AM EDT      Time spent with Patient: 29 minutes  This is patient's third  [facility] appointment. Reason for Consult:    Chief Complaint   Patient presents with    Anxiety    Other     Concentration issues     Referring Provider: Juan Fraser MD  [address] [phone]    Feedback given to PCP: not indicated at this time. TELEHEALTH VISIT -- Audio/Visual    Services were provided through a video synchronous discussion virtually to substitute for in-person clinic visit. Pt gave verbal informed consent to participate in telehealth services. Conducted a risk-benefit analysis and determined that the patient's presenting problems are consistent with the use of telepsychology. Determined that the patient has sufficient knowledge and skills in the use of technology enabling them to adequately benefit from telepsychology. It was determined that this patient was able to be properly treated without an in-person session. Patient verified that they were currently located at the [facility] address that was provided during registration. Verified the following information:  Patient's identification: Yes  Patient location: [place] [address]   Patient's call back number: [phone]   Patient's emergency contact's name and number, as well as permission to contact them if needed: Extended Emergency Contact Information  Primary Emergency Contact: [facility]  Address: [address] [place] [address], [address] [place] [address] Phone: [phone]  Relation: Spouse     Provider location: [place], [place]    S:  Pt feels anxiety has increased since last visit. Noted increased stressors/triggers. Frequent worrying and what-if thinking. Feels mind is \"everywhere. \" Reported at his 59th birthday party he had thoughts that no one had a good time, people were judging him - although he

## 2023-06-15 ENCOUNTER — TELEMEDICINE (OUTPATIENT)
Dept: PSYCHOLOGY | Age: 58
End: 2023-06-15
Payer: COMMERCIAL

## 2023-06-15 DIAGNOSIS — R41.840 IMPAIRED CONCENTRATION: ICD-10-CM

## 2023-06-15 DIAGNOSIS — F41.9 ANXIETY: Primary | ICD-10-CM

## 2023-06-15 PROCEDURE — 90832 PSYTX W PT 30 MINUTES: CPT

## 2023-06-29 RX ORDER — ATORVASTATIN CALCIUM 40 MG/1
TABLET, FILM COATED ORAL
Qty: 90 TABLET | Refills: 0 | Status: SHIPPED | OUTPATIENT
Start: 2023-06-29

## 2023-07-06 ENCOUNTER — TELEMEDICINE (OUTPATIENT)
Dept: PSYCHOLOGY | Age: 58
End: 2023-07-06
Payer: COMMERCIAL

## 2023-07-06 DIAGNOSIS — R41.840 IMPAIRED CONCENTRATION: ICD-10-CM

## 2023-07-06 DIAGNOSIS — F41.9 ANXIETY: Primary | ICD-10-CM

## 2023-07-06 PROCEDURE — 90832 PSYTX W PT 30 MINUTES: CPT

## 2023-07-27 ENCOUNTER — TELEMEDICINE (OUTPATIENT)
Dept: PSYCHOLOGY | Age: 58
End: 2023-07-27
Payer: COMMERCIAL

## 2023-07-27 DIAGNOSIS — R41.840 IMPAIRED CONCENTRATION: ICD-10-CM

## 2023-07-27 DIAGNOSIS — F41.9 ANXIETY: Primary | ICD-10-CM

## 2023-07-27 PROCEDURE — 90832 PSYTX W PT 30 MINUTES: CPT

## 2023-07-27 NOTE — PROGRESS NOTES
stop or control worrying More than half the days   Worrying too much about different things Several days   Trouble relaxing Nearly every day   Being so restless that it is hard to sit still Nearly every day   Becoming easily annoyed or irritable Several days   Feeling afraid as if something awful might happen More than half the days   MIKE-7 Total Score 13   How difficult have these problems made it for you to do your work, take care of things at home, or get along with other people? Somewhat difficult     MIKE 7 SCORE 4/18/2023   MIKE-7 Total Score 13       Interpretation of Total Score. Anxiety Severity: Score 0-4: Minimal Anxiety. Score 5-9: Mild Anxiety. Score 10-14: Moderate Anxiety. Score greater than 15: Severe Anxiety. Diagnosis:    1. Anxiety    2. Impaired concentration        Past Medical History      Diagnosis Date    [place]atston coronary artery calcium score between 100 and 400 2/18/2022    Environmental allergies     History of depression     in family    Personal history of COVID-19 2/9/2021 1/20/2021    Scoliosis     as teen       Plan:  Pt interventions:  Trained in strategies for increasing balanced thinking, Discussed self-care (sleep, nutrition, rewarding activities, social support, exercise), Supportive techniques, Emphasized self-care as important for managing overall health, Provided Psychoeducation re: ACT values plan; psychological flexibility, and Identified maladaptive thoughts    Pt Behavioral Change Plan:   See Pt Instructions.

## 2023-07-27 NOTE — PATIENT INSTRUCTIONS
Return to see Dr. Rk Elizalde in 2 weeks    Book recommendations:  Get Out of Your Mind and Into Your Life by Giovanna Stephens,   A Liberated Mind: The essential guide to ACT by Giovanna Stephens, PhD  ACT Made Simple:  An Easy-To-Read Primer on Acceptance and Commitment Therapy by Giovanna Stephens,  and Soham oK  The Acceptance and Commitment Therapy (ACT) Journal: A 12-week Workbook and  for Creating Lasting Change in Your Life by Dr. Collette Areola (Author), Dr. Mariel Reyes (Author), Dr. Anna Harrison (Author)

## 2023-08-08 PROBLEM — F41.9 ANXIETY: Status: ACTIVE | Noted: 2023-08-08

## 2023-08-09 ENCOUNTER — OFFICE VISIT (OUTPATIENT)
Dept: INTERNAL MEDICINE CLINIC | Age: 58
End: 2023-08-09
Payer: COMMERCIAL

## 2023-08-09 VITALS
OXYGEN SATURATION: 97 % | HEART RATE: 64 BPM | WEIGHT: 191.4 LBS | BODY MASS INDEX: 31.37 KG/M2 | SYSTOLIC BLOOD PRESSURE: 123 MMHG | DIASTOLIC BLOOD PRESSURE: 80 MMHG

## 2023-08-09 DIAGNOSIS — F41.8 DEPRESSION WITH ANXIETY: Primary | ICD-10-CM

## 2023-08-09 DIAGNOSIS — E78.2 MIXED HYPERLIPIDEMIA: ICD-10-CM

## 2023-08-09 DIAGNOSIS — H61.23 BILATERAL IMPACTED CERUMEN: ICD-10-CM

## 2023-08-09 DIAGNOSIS — F41.8 DEPRESSION WITH ANXIETY: ICD-10-CM

## 2023-08-09 DIAGNOSIS — E66.9 CLASS 1 OBESITY WITH SERIOUS COMORBIDITY AND BODY MASS INDEX (BMI) OF 31.0 TO 31.9 IN ADULT, UNSPECIFIED OBESITY TYPE: ICD-10-CM

## 2023-08-09 PROBLEM — E66.811 CLASS 1 OBESITY WITH SERIOUS COMORBIDITY AND BODY MASS INDEX (BMI) OF 31.0 TO 31.9 IN ADULT: Status: ACTIVE | Noted: 2023-08-09

## 2023-08-09 PROBLEM — F41.9 ANXIETY: Status: RESOLVED | Noted: 2023-08-08 | Resolved: 2023-08-09

## 2023-08-09 PROCEDURE — 1036F TOBACCO NON-USER: CPT | Performed by: INTERNAL MEDICINE

## 2023-08-09 PROCEDURE — G8417 CALC BMI ABV UP PARAM F/U: HCPCS | Performed by: INTERNAL MEDICINE

## 2023-08-09 PROCEDURE — 3017F COLORECTAL CA SCREEN DOC REV: CPT | Performed by: INTERNAL MEDICINE

## 2023-08-09 PROCEDURE — G8427 DOCREV CUR MEDS BY ELIG CLIN: HCPCS | Performed by: INTERNAL MEDICINE

## 2023-08-09 PROCEDURE — 99214 OFFICE O/P EST MOD 30 MIN: CPT | Performed by: INTERNAL MEDICINE

## 2023-08-09 ASSESSMENT — PATIENT HEALTH QUESTIONNAIRE - PHQ9
SUM OF ALL RESPONSES TO PHQ QUESTIONS 1-9: 11
SUM OF ALL RESPONSES TO PHQ QUESTIONS 1-9: 10
1. LITTLE INTEREST OR PLEASURE IN DOING THINGS: 0
SUM OF ALL RESPONSES TO PHQ QUESTIONS 1-9: 11
2. FEELING DOWN, DEPRESSED OR HOPELESS: 2
7. TROUBLE CONCENTRATING ON THINGS, SUCH AS READING THE NEWSPAPER OR WATCHING TELEVISION: 2
9. THOUGHTS THAT YOU WOULD BE BETTER OFF DEAD, OR OF HURTING YOURSELF: 1
SUM OF ALL RESPONSES TO PHQ9 QUESTIONS 1 & 2: 2
SUM OF ALL RESPONSES TO PHQ QUESTIONS 1-9: 11
4. FEELING TIRED OR HAVING LITTLE ENERGY: 1
5. POOR APPETITE OR OVEREATING: 1
10. IF YOU CHECKED OFF ANY PROBLEMS, HOW DIFFICULT HAVE THESE PROBLEMS MADE IT FOR YOU TO DO YOUR WORK, TAKE CARE OF THINGS AT HOME, OR GET ALONG WITH OTHER PEOPLE: 1
6. FEELING BAD ABOUT YOURSELF - OR THAT YOU ARE A FAILURE OR HAVE LET YOURSELF OR YOUR FAMILY DOWN: 3
3. TROUBLE FALLING OR STAYING ASLEEP: 0
8. MOVING OR SPEAKING SO SLOWLY THAT OTHER PEOPLE COULD HAVE NOTICED. OR THE OPPOSITE, BEING SO FIGETY OR RESTLESS THAT YOU HAVE BEEN MOVING AROUND A LOT MORE THAN USUAL: 1

## 2023-08-09 NOTE — ASSESSMENT & PLAN NOTE
Weight increasing. Discussed diet, very healthy but needs to look at overall caloric intake. Work on increasing exercise with balance between cardio and strength. Checking TSH and testosterone as well.

## 2023-08-09 NOTE — ASSESSMENT & PLAN NOTE
Recent worsening in depressive symptoms although anxiety is much better. PHQ-9 11. Rule out low testosterone or thyroid dysfunction. Discussed possible medication use with Wellbutrin XL at appointment with Dr. Elaine Crocker tomorrow. Reviewed risk benefits and adverse effects of Wellbutrin.

## 2023-08-09 NOTE — PROGRESS NOTES
Izabel Flores   :  1965    ASSESSMENT/PLAN:   Depression with anxiety  Assessment & Plan:  Recent worsening in depressive symptoms although anxiety is much better. PHQ-9 11. Rule out low testosterone or thyroid dysfunction. Discussed possible medication use with Wellbutrin XL at appointment with Dr. Bridget Holcomb tomorrow. Reviewed risk benefits and adverse effects of Wellbutrin. Orders:  -     Testosterone, free, total; Future  -     TSH with Reflex; Future  Class 1 obesity with serious comorbidity and body mass index (BMI) of 31.0 to 31.9 in adult, unspecified obesity type  Assessment & Plan:  Weight increasing. Discussed diet, very healthy but needs to look at overall caloric intake. Work on increasing exercise with balance between cardio and strength. Checking TSH and testosterone as well. Mixed hyperlipidemia  Assessment & Plan:  Asymptomatic, tolerating atorvastatin with no difficulty. Orders:  -     Lipid Panel; Future  -     Basic Metabolic Panel; Future  Bilateral impacted cerumen  Comments:  Irrigation in office. Return in about 6 months (around 2024) for CPE (may add sooner follow-up if starting Wellbutrin after labs back). SUBJECTIVE     59 y.o. male established patient here for:   Chief Complaint   Patient presents with    Medication Check     Seeing Dr. Bridget Holcomb. Finding it very helpful, but feeling more depressed. Thinks has had some depression for a really long time. Seeing tomorrow. Not sure if he needs medication. Struggling with weight. Leg better from PT. Eats very well, cut way down on calories, and now drinking low calorie non-alcoholic beer. Exercise fluctuates. Walks 3-4x/week, 25-50 minutes. Also home PT 2-3x/week. Sleep has improved. 7-8 hours. cut down on snoring by sleeping on 2 pillows.      Review of Systems    Outpatient Medications Marked as Taking for the 23 encounter (Office Visit) with Gibran Manzanares MD   Medication Sig Dispense Refill

## 2023-08-10 ENCOUNTER — TELEMEDICINE (OUTPATIENT)
Dept: PSYCHOLOGY | Age: 58
End: 2023-08-10
Payer: COMMERCIAL

## 2023-08-10 DIAGNOSIS — F41.9 ANXIETY: ICD-10-CM

## 2023-08-10 DIAGNOSIS — R41.840 IMPAIRED CONCENTRATION: ICD-10-CM

## 2023-08-10 DIAGNOSIS — F33.1 MODERATE EPISODE OF RECURRENT MAJOR DEPRESSIVE DISORDER (HCC): Primary | ICD-10-CM

## 2023-08-10 LAB
ANION GAP SERPL CALCULATED.3IONS-SCNC: 15 MMOL/L (ref 3–16)
BUN SERPL-MCNC: 14 MG/DL (ref 7–20)
CALCIUM SERPL-MCNC: 9.3 MG/DL (ref 8.3–10.6)
CHLORIDE SERPL-SCNC: 98 MMOL/L (ref 99–110)
CHOLEST SERPL-MCNC: 146 MG/DL (ref 0–199)
CO2 SERPL-SCNC: 23 MMOL/L (ref 21–32)
CREAT SERPL-MCNC: 0.9 MG/DL (ref 0.8–1.3)
GFR SERPLBLD CREATININE-BSD FMLA CKD-EPI: >60 ML/MIN/{1.73_M2}
GLUCOSE SERPL-MCNC: 82 MG/DL (ref 70–99)
HDLC SERPL-MCNC: 48 MG/DL (ref 40–60)
LDLC SERPL CALC-MCNC: 66 MG/DL
POTASSIUM SERPL-SCNC: 4.2 MMOL/L (ref 3.5–5.1)
SODIUM SERPL-SCNC: 136 MMOL/L (ref 136–145)
TRIGL SERPL-MCNC: 161 MG/DL (ref 0–150)
TSH SERPL DL<=0.005 MIU/L-ACNC: 1.58 UIU/ML (ref 0.27–4.2)
VLDLC SERPL CALC-MCNC: 32 MG/DL

## 2023-08-10 PROCEDURE — 90832 PSYTX W PT 30 MINUTES: CPT

## 2023-08-10 NOTE — PROGRESS NOTES
Behavioral Health Consultation  GUTIERREZ VILLALOBOS Psy.D. Psychologist  8/10/2023  3:05 PM EDT      Time spent with Patient: 34 minutes  This [facility]. Reason for Consult:    Chief Complaint   Patient presents with    Depression    Anxiety    Other     Impaired concentration      Referring Provider: Lorraine Lamar MD  [address] [place] [address] [phone]    Feedback given to PCP: not indicated at this time. TELEHEALTH VISIT -- Audio/Visual    Services were provided through a video synchronous discussion virtually to substitute for in-person clinic visit. Pt gave verbal informed consent to participate in telehealth services. Conducted a risk-benefit analysis and determined that the patient's presenting problems are consistent with the use of telepsychology. Determined that the patient has sufficient knowledge and skills in the use of technology enabling them to adequately benefit from telepsychology. It was determined that this patient was able to be properly treated without an in-person session. Patient verified that they were currently located at the [place] address that was provided during registration. Verified the following information:  Patient's identification: Yes  Patient location: [address] [place] [address]   Patient's call back number: [phone]   Patient's emergency contact's name and number, as well as permission to contact them if needed: Extended Emergency Contact Information  Primary Emergency Contact: [facility]  Address: [address] [place] [id number] [address] Phone: [phone]  Relation: Spouse     Provider location: [place], [place]    S:  Pt reported he has felt down and depressed for \"a long time\" but feels he has more insight now. Feels lows are \"stronger. \" Discussed that he went through values exercises and felt bad because he stated he does not find happiness in things he does.  Feels

## 2023-08-10 NOTE — PATIENT INSTRUCTIONS
Return to see Dr. Oralia Ortiz in 2 weeks  Check [employer] or call insurance for ongoing therapy referrals  Continue practicing cognitive restructuring techniques  Call PC office if mood significantly worsens; if an emergency, such as suicidal thoughts, call 911, go to nearest ER, or call crisis hotline

## 2023-08-11 LAB
SHBG SERPL-SCNC: 86 NMOL/L (ref 11–80)
TESTOST FREE SERPL-MCNC: 73.5 PG/ML (ref 47–244)
TESTOST SERPL-MCNC: 676 NG/DL (ref 220–1000)

## 2023-08-15 ENCOUNTER — PATIENT MESSAGE (OUTPATIENT)
Dept: INTERNAL MEDICINE CLINIC | Age: 58
End: 2023-08-15

## 2023-08-17 RX ORDER — BUPROPION HYDROCHLORIDE 150 MG/1
TABLET ORAL
Qty: 53 TABLET | Refills: 0 | Status: SHIPPED | OUTPATIENT
Start: 2023-08-17

## 2023-08-24 ENCOUNTER — TELEMEDICINE (OUTPATIENT)
Dept: PSYCHOLOGY | Age: 58
End: 2023-08-24
Payer: COMMERCIAL

## 2023-08-24 DIAGNOSIS — F33.9 EPISODE OF RECURRENT MAJOR DEPRESSIVE DISORDER, UNSPECIFIED DEPRESSION EPISODE SEVERITY (HCC): Primary | ICD-10-CM

## 2023-08-24 DIAGNOSIS — R41.840 IMPAIRED CONCENTRATION: ICD-10-CM

## 2023-08-24 DIAGNOSIS — F41.9 ANXIETY: ICD-10-CM

## 2023-08-24 PROCEDURE — 90832 PSYTX W PT 30 MINUTES: CPT

## 2023-08-24 NOTE — PROGRESS NOTES
Behavioral Health Consultation  GUTIERREZ VILLALOBOS Psy.D. Psychologist  8/24/2023  10:26 AM EDT      Time spent with Patient: 34 minutes  This is patient's eighth  [facility] appointment. Reason for Consult:    Chief Complaint   Patient presents with    Depression    Anxiety    Other     Impaired concentration     Referring Provider: Kyrie Morris MD  [address] [place] [address] [phone]    Feedback given to PCP: not indicated at this time. TELEHEALTH VISIT -- Audio/Visual    Services were provided through a video synchronous discussion virtually to substitute for in-person clinic visit. Pt gave verbal informed consent to participate in telehealth services. Conducted a risk-benefit analysis and determined that the patient's presenting problems are consistent with the use of telepsychology. Determined that the patient has sufficient knowledge and skills in the use of technology enabling them to adequately benefit from telepsychology. It was determined that this patient was able to be properly treated without an in-person session. Patient verified that they were currently located at the [place] address that was provided during registration. Verified the following information:  Patient's identification: Yes  Patient location: [address] [place] [address]   Patient's call back number: [phone]   Patient's emergency contact's name and number, as well as permission to contact them if needed: Extended Emergency Contact Information  Primary Emergency Contact: [facility]  Address: [address] [place] [address] [address] Phone: [phone]  Relation: Spouse     Provider location: [place]:  Pt reported he started Wellbutrin last Friday. Indicated decreased anxiety, clearer thinking, increased productivity. 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 Since taking the medication, he did note early awakenings (between 4am - 6am).     Denied any

## 2023-09-06 ENCOUNTER — TELEPHONE (OUTPATIENT)
Dept: INTERNAL MEDICINE CLINIC | Age: 58
End: 2023-09-06

## 2023-09-06 RX ORDER — BUPROPION HYDROCHLORIDE 300 MG/1
300 TABLET ORAL EVERY MORNING
Qty: 30 TABLET | Refills: 1 | Status: SHIPPED | OUTPATIENT
Start: 2023-09-06

## 2023-09-06 NOTE — TELEPHONE ENCOUNTER
Liza with [address] is calling for clarification on Wellbutrin dosage/directions. Please call her back at  [phone] to discuss.

## 2023-09-07 ENCOUNTER — TELEPHONE (OUTPATIENT)
Dept: INTERNAL MEDICINE CLINIC | Age: 58
End: 2023-09-07

## 2023-09-07 NOTE — TELEPHONE ENCOUNTER
Patient recently returned from traveling with daughter and he started to feel bad on Tuesday (tightness in chest-easily winded, cough, fatigue, dizziness, racing heartbeat). He took a COVID Test today and it was positive. His wife is also not feeling very well but as of today she is still  testing negative for COVID. Please call him to discuss how to proceed (Paxlovid, VV?)  If possible please call him back yet today.

## 2023-09-08 ENCOUNTER — TELEMEDICINE (OUTPATIENT)
Dept: INTERNAL MEDICINE CLINIC | Age: 58
End: 2023-09-08
Payer: COMMERCIAL

## 2023-09-08 DIAGNOSIS — U07.1 COVID-19 VIRUS INFECTION: Primary | ICD-10-CM

## 2023-09-08 PROCEDURE — 99213 OFFICE O/P EST LOW 20 MIN: CPT | Performed by: INTERNAL MEDICINE

## 2023-09-08 PROCEDURE — 3017F COLORECTAL CA SCREEN DOC REV: CPT | Performed by: INTERNAL MEDICINE

## 2023-09-08 PROCEDURE — G8427 DOCREV CUR MEDS BY ELIG CLIN: HCPCS | Performed by: INTERNAL MEDICINE

## 2023-09-08 NOTE — PROGRESS NOTES
TELEHEALTH EVALUATION -- Audio/Visual (During TACFX-17 public health emergency)  2023    Assessment/Plan   1. COVID-19 virus infection  Mild covid symptoms, day #3  Discussed Paxlovid- indications, R/B/A and review of possible drug interactions, and patient does not opt to take the medication. Instructed on self-management of symptoms. If possible, follow-pulse ox and contact office if oxygen <94%, or worsening symptoms. Reviewed current CDC isolation/masking guidelines for covid -19 infection    Instructed to call office if symptoms worsen or fail to improve as anticipated. SUBJECTIVE/OBJECTIVE  Susan Curran (:  1965) has requested an audio/video evaluation for the following concern(s):   Cough (Started Tuesday night and tested positive today) and Fatigue     Back from [facility] on . Tuesday started with heartburn, cough and headache. Cough getting better but lots of nasal congestion. Temp 99, has felt feverish  Tested positive for COVID yesterday 2023. Has the following symptoms:cough, headache, congestion or runny nose, muscle or body aches, or fatigue. Denies: new loss of smell or taste, nausea or vomitting, or diarrhea. Review of Systems    Prior to Visit Medications    Medication Sig Taking?  Authorizing Provider   buPROPion (WELLBUTRIN XL) 300 MG extended release tablet Take 1 tablet by mouth every morning Yes Elvia Clark MD   atorvastatin (LIPITOR) 40 MG tablet TAKE ONE TABLET BY MOUTH DAILY Yes Jose Cruz Mitchell MD   loratadine (CLARITIN) 10 MG tablet Take 1 tablet by mouth daily Yes Historical Provider, MD   VITAMIN D PO Take by mouth Yes Historical Provider, MD   aspirin EC 81 MG EC tablet Take 1 tablet by mouth daily Yes Elvia Clark MD   fluticasone (FLONASE) 50 MCG/ACT nasal spray SPRAY 2 SPRAYS BY NASAL ROUTE DAILY Yes Elvia Clark MD       Social History     Tobacco Use    Smoking status: Former     Packs/day: 0.25     Years: 5.00

## 2023-09-12 ENCOUNTER — TELEMEDICINE (OUTPATIENT)
Dept: PSYCHOLOGY | Age: 58
End: 2023-09-12
Payer: COMMERCIAL

## 2023-09-12 DIAGNOSIS — F41.9 ANXIETY: ICD-10-CM

## 2023-09-12 DIAGNOSIS — F33.9 EPISODE OF RECURRENT MAJOR DEPRESSIVE DISORDER, UNSPECIFIED DEPRESSION EPISODE SEVERITY (HCC): Primary | ICD-10-CM

## 2023-09-12 DIAGNOSIS — R41.840 IMPAIRED CONCENTRATION: ICD-10-CM

## 2023-09-12 PROCEDURE — 90832 PSYTX W PT 30 MINUTES: CPT

## 2023-09-12 RX ORDER — BUPROPION HYDROCHLORIDE 150 MG/1
TABLET ORAL
Qty: 53 TABLET | Refills: 0 | OUTPATIENT
Start: 2023-09-12

## 2023-09-12 NOTE — PROGRESS NOTES
problems made it for you to do your work, take care of things at home, or get along with other people? 1 1  0            8/9/2023     2:46 PM 4/18/2023    10:27 AM 2/22/2023     2:19 PM 2/18/2022    10:06 AM 2/9/2021     2:02 PM 2/5/2020     2:37 PM 5/22/2019     9:02 AM   PHQ Scores   PHQ2 Score 2 2 0 0 2 0 0   PHQ9 Score 11 16 0 0 2 0 0       Interpretation of Total Score Depression Severity: 1-4 = Minimal depression, 5-9 = Mild depression, 10-14 = Moderate depression, 15-19 = Moderately severe depression, 20-27 = Severe depression         4/18/2023    10:00 AM   MIKE-7 SCREENING   Feeling nervous, anxious, or on edge Several days   Not being able to stop or control worrying More than half the days   Worrying too much about different things Several days   Trouble relaxing Nearly every day   Being so restless that it is hard to sit still Nearly every day   Becoming easily annoyed or irritable Several days   Feeling afraid as if something awful might happen More than half the days   MIKE-7 Total Score 13   How difficult have these problems made it for you to do your work, take care of things at home, or get along with other people? Somewhat difficult         4/18/2023    10:00 AM   MIKE 7 SCORE   MIKE-7 Total Score 13       Interpretation of Total Score. Anxiety Severity: Score 0-4: Minimal Anxiety. Score 5-9: Mild Anxiety. Score 10-14: Moderate Anxiety. Score greater than 15: Severe Anxiety. Diagnosis:    1. Episode of recurrent major depressive disorder, unspecified depression episode severity ([address])    2. Anxiety    3.  Impaired concentration        Past Medical History      Diagnosis Date    [facility] coronary artery calcium score between 100 and 400 2/18/2022    Environmental allergies     History of depression     in family    Personal history of COVID-19 2/9/2021 1/20/2021    Scoliosis     as teen       Plan:  Pt interventions:  Trained in strategies for increasing balanced thinking, Discussed and set plan for

## 2023-09-12 NOTE — TELEPHONE ENCOUNTER
Last appointment: 9/8/2023  Next appointment: 10/18/2023  Last refill: 9/6/23    Verified with pharmacy he has 1 refill left, just picked on 9/12/23

## 2023-09-28 ENCOUNTER — TELEMEDICINE (OUTPATIENT)
Dept: PSYCHOLOGY | Age: 58
End: 2023-09-28
Payer: COMMERCIAL

## 2023-09-28 DIAGNOSIS — F33.9 EPISODE OF RECURRENT MAJOR DEPRESSIVE DISORDER, UNSPECIFIED DEPRESSION EPISODE SEVERITY (HCC): Primary | ICD-10-CM

## 2023-09-28 DIAGNOSIS — F41.9 ANXIETY: ICD-10-CM

## 2023-09-28 DIAGNOSIS — R41.840 IMPAIRED CONCENTRATION: ICD-10-CM

## 2023-09-28 PROCEDURE — 90832 PSYTX W PT 30 MINUTES: CPT

## 2023-09-28 NOTE — PROGRESS NOTES
Emphasized self-care as important for managing overall health, and Identified maladaptive thoughts    Pt Behavioral Change Plan:   See Pt Instructions.

## 2023-10-03 RX ORDER — ATORVASTATIN CALCIUM 40 MG/1
40 TABLET, FILM COATED ORAL DAILY
Qty: 90 TABLET | Refills: 1 | Status: SHIPPED | OUTPATIENT
Start: 2023-10-03

## 2023-10-17 ENCOUNTER — TELEMEDICINE (OUTPATIENT)
Dept: PSYCHOLOGY | Age: 58
End: 2023-10-17
Payer: COMMERCIAL

## 2023-10-17 DIAGNOSIS — F32.A DEPRESSION, UNSPECIFIED DEPRESSION TYPE: Primary | ICD-10-CM

## 2023-10-17 DIAGNOSIS — R41.840 IMPAIRED CONCENTRATION: ICD-10-CM

## 2023-10-17 DIAGNOSIS — F41.9 ANXIETY: ICD-10-CM

## 2023-10-17 PROCEDURE — 90834 PSYTX W PT 45 MINUTES: CPT

## 2023-10-17 ASSESSMENT — ANXIETY QUESTIONNAIRES
6. BECOMING EASILY ANNOYED OR IRRITABLE: 0
2. NOT BEING ABLE TO STOP OR CONTROL WORRYING: 0
3. WORRYING TOO MUCH ABOUT DIFFERENT THINGS: 1
7. FEELING AFRAID AS IF SOMETHING AWFUL MIGHT HAPPEN: 1
5. BEING SO RESTLESS THAT IT IS HARD TO SIT STILL: 1
4. TROUBLE RELAXING: 1
GAD7 TOTAL SCORE: 5
1. FEELING NERVOUS, ANXIOUS, OR ON EDGE: 1

## 2023-10-17 ASSESSMENT — PATIENT HEALTH QUESTIONNAIRE - PHQ9
SUM OF ALL RESPONSES TO PHQ QUESTIONS 1-9: 6
SUM OF ALL RESPONSES TO PHQ QUESTIONS 1-9: 6
6. FEELING BAD ABOUT YOURSELF - OR THAT YOU ARE A FAILURE OR HAVE LET YOURSELF OR YOUR FAMILY DOWN: 0
SUM OF ALL RESPONSES TO PHQ QUESTIONS 1-9: 6
SUM OF ALL RESPONSES TO PHQ QUESTIONS 1-9: 6
SUM OF ALL RESPONSES TO PHQ9 QUESTIONS 1 & 2: 1
3. TROUBLE FALLING OR STAYING ASLEEP: 2
9. THOUGHTS THAT YOU WOULD BE BETTER OFF DEAD, OR OF HURTING YOURSELF: 0
5. POOR APPETITE OR OVEREATING: 0
8. MOVING OR SPEAKING SO SLOWLY THAT OTHER PEOPLE COULD HAVE NOTICED. OR THE OPPOSITE, BEING SO FIGETY OR RESTLESS THAT YOU HAVE BEEN MOVING AROUND A LOT MORE THAN USUAL: 2
10. IF YOU CHECKED OFF ANY PROBLEMS, HOW DIFFICULT HAVE THESE PROBLEMS MADE IT FOR YOU TO DO YOUR WORK, TAKE CARE OF THINGS AT HOME, OR GET ALONG WITH OTHER PEOPLE: 1
2. FEELING DOWN, DEPRESSED OR HOPELESS: 1
7. TROUBLE CONCENTRATING ON THINGS, SUCH AS READING THE NEWSPAPER OR WATCHING TELEVISION: 1
1. LITTLE INTEREST OR PLEASURE IN DOING THINGS: 0
4. FEELING TIRED OR HAVING LITTLE ENERGY: 0

## 2023-10-17 NOTE — PROGRESS NOTES
Environmental allergies     History of depression     in family    Personal history of COVID-19 2/9/2021 1/20/2021    Scoliosis     as teen       Plan:  Pt interventions:  Trained in strategies for increasing balanced thinking, Discussed and set plan for behavioral activation, Trained in relaxation strategies, Discussed self-care (sleep, nutrition, rewarding activities, social support, exercise), Supportive techniques, Emphasized self-care as important for managing overall health, and Identified maladaptive thoughts    Pt Behavioral Change Plan:   See Pt Instructions. Pt declined to schedule f/u at this time.  Agreed to mail therapy referral list.

## 2023-10-17 NOTE — PATIENT INSTRUCTIONS
Return to see Dr. Mckinley García prn  Continue cognitive restructuring exercises  Complete responsibilities/tasks in smaller chunks  Identify barriers to execution/delivery of projects then make plan to address barriers  Call PC office if mood significantly worsens

## 2023-10-18 ENCOUNTER — OFFICE VISIT (OUTPATIENT)
Dept: INTERNAL MEDICINE CLINIC | Age: 58
End: 2023-10-18
Payer: COMMERCIAL

## 2023-10-18 VITALS
BODY MASS INDEX: 29.83 KG/M2 | WEIGHT: 182 LBS | OXYGEN SATURATION: 97 % | DIASTOLIC BLOOD PRESSURE: 77 MMHG | SYSTOLIC BLOOD PRESSURE: 136 MMHG | HEART RATE: 78 BPM

## 2023-10-18 DIAGNOSIS — H93.13 TINNITUS OF BOTH EARS: ICD-10-CM

## 2023-10-18 DIAGNOSIS — F41.8 DEPRESSION WITH ANXIETY: Primary | ICD-10-CM

## 2023-10-18 PROCEDURE — 1036F TOBACCO NON-USER: CPT | Performed by: INTERNAL MEDICINE

## 2023-10-18 PROCEDURE — 99214 OFFICE O/P EST MOD 30 MIN: CPT | Performed by: INTERNAL MEDICINE

## 2023-10-18 PROCEDURE — 3017F COLORECTAL CA SCREEN DOC REV: CPT | Performed by: INTERNAL MEDICINE

## 2023-10-18 PROCEDURE — G8417 CALC BMI ABV UP PARAM F/U: HCPCS | Performed by: INTERNAL MEDICINE

## 2023-10-18 PROCEDURE — G8427 DOCREV CUR MEDS BY ELIG CLIN: HCPCS | Performed by: INTERNAL MEDICINE

## 2023-10-18 PROCEDURE — 90471 IMMUNIZATION ADMIN: CPT | Performed by: INTERNAL MEDICINE

## 2023-10-18 PROCEDURE — 90674 CCIIV4 VAC NO PRSV 0.5 ML IM: CPT | Performed by: INTERNAL MEDICINE

## 2023-10-18 PROCEDURE — G8482 FLU IMMUNIZE ORDER/ADMIN: HCPCS | Performed by: INTERNAL MEDICINE

## 2023-10-18 RX ORDER — BUPROPION HYDROCHLORIDE 150 MG/1
150 TABLET, EXTENDED RELEASE ORAL DAILY
Qty: 30 TABLET | Refills: 0 | Status: SHIPPED | OUTPATIENT
Start: 2023-10-18

## 2023-10-18 NOTE — PROGRESS NOTES
Mary Anne Canela   :  1965  10/18/2023    ASSESSMENT/PLAN:   Depression with anxiety  Assessment & Plan:  Great response to Wellbutrin  mg daily. Change to Wellbutrin  mg due to side effects (sleep-maintenance insomnia and change in urine stream). Would expect continued benefit in focus however we will have to see if the benefit with anxiety persists as well. Other considerations will be Wellbutrin  or going back to current dose and using something for sleep. Tinnitus of both ears  Assessment & Plan:  No red flags. Audiology referral.  Discussed white noise for masking. Orders:  -     Nunu Jurado, Audiology, [place]      No follow-ups on file. SUBJECTIVE     59 y.o. male established patient here for:   Chief Complaint   Patient presents with    Medication Check     Declined flu shot. Pleased with wellbutrin xl 300. Noticed an improvement very early on, even on the 150 mg dose. Better able to focus, helps energy  and felt that better able to process and adopt recommendations from Dr. Mayra Camargo. Not reacting as emotionally. Problems include insomnia, change in urine flow,   No longer sleep through the night. Would typically go to bed around 11 and sleep for at least 8 hours. Now awakens somewhere between 4-6 and can sometimes be awake for an hour or 2. When he does awaken however he feels relaxed rather than having the mind racing. Has tried a part of a gummy which has helped but prefers not to have to medicate. Also associated with the start of the Wellbutrin is a change in urine flow some difficulty with weaker, slower stream.  Particular bothersome during the night. Finally, notes that he is having constant tinnitus, usually buzzing or high pitched. Not pulsatile. Does not think he has any hearing issues. More noticeable when things are quiet.     Review of Systems    Outpatient Medications Marked as Taking for the

## 2023-10-18 NOTE — ASSESSMENT & PLAN NOTE
Great response to Wellbutrin  mg daily. Change to Wellbutrin  mg due to side effects (sleep-maintenance insomnia and change in urine stream). Would expect continued benefit in focus however we will have to see if the benefit with anxiety persists as well. Other considerations will be Wellbutrin  or going back to current dose and using something for sleep.

## 2023-10-20 ENCOUNTER — PROCEDURE VISIT (OUTPATIENT)
Dept: AUDIOLOGY | Age: 58
End: 2023-10-20

## 2023-10-20 DIAGNOSIS — H90.3 SENSORINEURAL HEARING LOSS (SNHL) OF BOTH EARS: Primary | ICD-10-CM

## 2023-10-20 DIAGNOSIS — H93.13 TINNITUS OF BOTH EARS: ICD-10-CM

## 2023-10-20 NOTE — PROGRESS NOTES
stimuli. Tympanometry: Normal peak pressure and compliance, Type A tympanogram, consistent with normal middle ear function. Reliability: Good  Transducer: Inserts    See scanned audiogram dated 10/20/2023 for results. PATIENT EDUCATION:       The following items were discussed with the patient:   - Good Communication Strategies  - Hearing Loss and Hearing Aids  - Tinnitus Management Strategies    - Noise-Induced Hearing Loss and use of Hearing Protection Devices (HPDs)     Educational information was shared in the After Visit Summary. RECOMMENDATIONS:                                                                                                                                                                                                                                                                      The following items are recommended based on patient report and results from today's appointment:  - Continue medical follow-up with Lorraine Lamar MD.  - Retest hearing as medically indicated and/or sooner if a change in hearing is noted. - Utilize \"Good Communication Strategies\" as discussed to assist in speech understanding with communication partners. - Maintain a sound enriched environment to assist in the management of tinnitus symptoms.  - Use hearing protection devices (HPDs), such as protective ear muffs and ear plugs, when exposed to dangerous sound levels.          [facility] Manuel Arvizu  Audiologist       Chart CC'd to: Lorraine Lamar MD       Degree of   Hearing Sensitivity dB Range   Within Normal Limits (WNL) 0 - 20   Mild 20 - 40   Moderate 40 - 55   Moderately-Severe 55 - 70   Severe 70 - 90   Profound 90 +

## 2023-10-27 NOTE — PATIENT INSTRUCTIONS
is missing. If you are beginning your process with hearing aid(s), schedule a \"Hearing Aid Evaluation\" with an audiologist to discuss your lifestyle, features of hearing aid technology, and styles of hearing aids available. It is recommended that you contact your insurance company to determine if you have a hearing aid benefit, as this may dictate who you can see for these services. Have hearing tests as your doctor suggests. They can show whether your hearing has changed. Your hearing aid may need to be adjusted. Use other assistive devices as needed. These may include:  Telephone amplifiers and hearing aids that can connect to a television, stereo, radio, or microphone. Devices that use lights or vibrations. These alert you to the doorbell, a ringing telephone, or a baby monitor. Television closed-captioning. This shows the words at the bottom of the screen. Most new TVs can do this. TTY (text telephone). This lets you type messages back and forth on the telephone instead of talking or listening. These devices are also called TDD. When messages are typed on the keyboard, they are sent over the phone line to a receiving TTY. The message is shown on a monitor. Use pagers, fax machines, text, and email if it is hard for you to communicate by telephone. Try to learn a listening technique called speech-reading. It is not lip-reading. You pay attention to people's gestures, expressions, posture, and tone of voice. These clues can help you understand what a person is saying. Face the person you are talking to, and have him or her face you. Make sure the lighting is good. You need to see the other person's face clearly. Think about counseling if you need help to adjust to your hearing loss. When should you call for help? Watch closely for changes in your health, and be sure to contact your doctor if:    You think your hearing is getting worse. You have new symptoms, such as dizziness or nausea.

## 2023-11-01 RX ORDER — BUPROPION HYDROCHLORIDE 150 MG/1
150 TABLET, EXTENDED RELEASE ORAL DAILY
Qty: 90 TABLET | Refills: 3 | Status: SHIPPED | OUTPATIENT
Start: 2023-11-01

## 2023-11-01 NOTE — TELEPHONE ENCOUNTER
Last appointment: 10/18/2023  Next appointment: 2/28/2024  Last refill: 10/18/2023  #30 x0    Requesting 90 day rx

## 2023-11-02 ENCOUNTER — TELEPHONE (OUTPATIENT)
Dept: ADMINISTRATIVE | Age: 58
End: 2023-11-02

## 2023-11-02 NOTE — TELEPHONE ENCOUNTER
Submitted PA for buPROPion HCl ER (SR) 150MG er tablets  Via CM Key: [id number] STATUS: PENDING.    Follow up done daily; if no response in three days we will refax for status check.  If another three days goes by with no response we will call the insurance for status.

## 2023-11-03 NOTE — TELEPHONE ENCOUNTER
\"DOES NOT REQUIRE PRIOR AUTHORIZATION AT THIS TIME.\"    If this requires a response please respond to the pool. (P [facility] MEDICINE Pre-Auth).    Please advise patient thank you.

## 2023-11-06 RX ORDER — BUPROPION HYDROCHLORIDE 300 MG/1
300 TABLET ORAL EVERY MORNING
Qty: 30 TABLET | Refills: 1 | OUTPATIENT
Start: 2023-11-06

## 2023-11-06 NOTE — TELEPHONE ENCOUNTER
Requested medication from the pharmacy is not the correct dosage.     Dosage changed to 150 mg QD and sent 11/1

## 2023-11-13 RX ORDER — BUPROPION HYDROCHLORIDE 150 MG/1
150 TABLET, EXTENDED RELEASE ORAL DAILY
Qty: 90 TABLET | Refills: 3 | OUTPATIENT
Start: 2023-11-13

## 2023-11-14 RX ORDER — BUPROPION HYDROCHLORIDE 150 MG/1
150 TABLET, EXTENDED RELEASE ORAL DAILY
Qty: 30 TABLET | OUTPATIENT
Start: 2023-11-14

## 2023-11-14 NOTE — TELEPHONE ENCOUNTER
Called pharmacy and verified script was received on 11/1/23. Ok to deny this request as too soon to fill.

## 2024-02-01 RX ORDER — ATORVASTATIN CALCIUM 40 MG/1
40 TABLET, FILM COATED ORAL DAILY
Qty: 90 TABLET | Refills: 1 | Status: SHIPPED | OUTPATIENT
Start: 2024-02-01

## 2024-02-01 NOTE — TELEPHONE ENCOUNTER
Medication:   Requested Prescriptions     Pending Prescriptions Disp Refills    atorvastatin (LIPITOR) 40 MG tablet [Pharmacy Med Name: ATORVASTATIN 40 MG TABLET] 90 tablet 1     Sig: TAKE 1 TABLET BY MOUTH DAILY     Last Filled:  10/3/23    Last appt: 10/18/2023   Next appt: 2/28/2024    Last Lipid:   Lab Results   Component Value Date/Time    CHOL 146 08/09/2023 03:03 PM    TRIG 161 08/09/2023 03:03 PM    HDL 48 08/09/2023 03:03 PM    LDLCALC 66 08/09/2023 03:03 PM

## 2024-02-27 ENCOUNTER — PATIENT MESSAGE (OUTPATIENT)
Dept: INTERNAL MEDICINE CLINIC | Age: 59
End: 2024-02-27

## 2024-02-28 ENCOUNTER — OFFICE VISIT (OUTPATIENT)
Dept: INTERNAL MEDICINE CLINIC | Age: 59
End: 2024-02-28
Payer: COMMERCIAL

## 2024-02-28 VITALS
SYSTOLIC BLOOD PRESSURE: 140 MMHG | DIASTOLIC BLOOD PRESSURE: 76 MMHG | HEART RATE: 72 BPM | WEIGHT: 184 LBS | HEIGHT: 65 IN | OXYGEN SATURATION: 98 % | BODY MASS INDEX: 30.66 KG/M2

## 2024-02-28 DIAGNOSIS — L98.9 SKIN LESION OF FACE: ICD-10-CM

## 2024-02-28 DIAGNOSIS — E78.2 MIXED HYPERLIPIDEMIA: ICD-10-CM

## 2024-02-28 DIAGNOSIS — Z00.00 WELL ADULT EXAM: Primary | ICD-10-CM

## 2024-02-28 PROCEDURE — G8482 FLU IMMUNIZE ORDER/ADMIN: HCPCS | Performed by: INTERNAL MEDICINE

## 2024-02-28 PROCEDURE — 99386 PREV VISIT NEW AGE 40-64: CPT | Performed by: INTERNAL MEDICINE

## 2024-02-28 SDOH — ECONOMIC STABILITY: FOOD INSECURITY: WITHIN THE PAST 12 MONTHS, YOU WORRIED THAT YOUR FOOD WOULD RUN OUT BEFORE YOU GOT MONEY TO BUY MORE.: NEVER TRUE

## 2024-02-28 SDOH — ECONOMIC STABILITY: FOOD INSECURITY: WITHIN THE PAST 12 MONTHS, THE FOOD YOU BOUGHT JUST DIDN'T LAST AND YOU DIDN'T HAVE MONEY TO GET MORE.: NEVER TRUE

## 2024-02-28 SDOH — ECONOMIC STABILITY: INCOME INSECURITY: HOW HARD IS IT FOR YOU TO PAY FOR THE VERY BASICS LIKE FOOD, HOUSING, MEDICAL CARE, AND HEATING?: NOT HARD AT ALL

## 2024-02-28 ASSESSMENT — PATIENT HEALTH QUESTIONNAIRE - PHQ9
8. MOVING OR SPEAKING SO SLOWLY THAT OTHER PEOPLE COULD HAVE NOTICED. OR THE OPPOSITE, BEING SO FIGETY OR RESTLESS THAT YOU HAVE BEEN MOVING AROUND A LOT MORE THAN USUAL: 1
1. LITTLE INTEREST OR PLEASURE IN DOING THINGS: 0
SUM OF ALL RESPONSES TO PHQ QUESTIONS 1-9: 10
10. IF YOU CHECKED OFF ANY PROBLEMS, HOW DIFFICULT HAVE THESE PROBLEMS MADE IT FOR YOU TO DO YOUR WORK, TAKE CARE OF THINGS AT HOME, OR GET ALONG WITH OTHER PEOPLE: 0
6. FEELING BAD ABOUT YOURSELF - OR THAT YOU ARE A FAILURE OR HAVE LET YOURSELF OR YOUR FAMILY DOWN: 2
4. FEELING TIRED OR HAVING LITTLE ENERGY: 0
SUM OF ALL RESPONSES TO PHQ QUESTIONS 1-9: 10
SUM OF ALL RESPONSES TO PHQ QUESTIONS 1-9: 10
7. TROUBLE CONCENTRATING ON THINGS, SUCH AS READING THE NEWSPAPER OR WATCHING TELEVISION: 3
SUM OF ALL RESPONSES TO PHQ9 QUESTIONS 1 & 2: 1
9. THOUGHTS THAT YOU WOULD BE BETTER OFF DEAD, OR OF HURTING YOURSELF: 0
3. TROUBLE FALLING OR STAYING ASLEEP: 2
2. FEELING DOWN, DEPRESSED OR HOPELESS: 1
5. POOR APPETITE OR OVEREATING: 1

## 2024-02-28 ASSESSMENT — ANXIETY QUESTIONNAIRES
5. BEING SO RESTLESS THAT IT IS HARD TO SIT STILL: 0
6. BECOMING EASILY ANNOYED OR IRRITABLE: 1
4. TROUBLE RELAXING: 1
3. WORRYING TOO MUCH ABOUT DIFFERENT THINGS: 1
2. NOT BEING ABLE TO STOP OR CONTROL WORRYING: 1
GAD7 TOTAL SCORE: 6
1. FEELING NERVOUS, ANXIOUS, OR ON EDGE: 1
7. FEELING AFRAID AS IF SOMETHING AWFUL MIGHT HAPPEN: 1
IF YOU CHECKED OFF ANY PROBLEMS ON THIS QUESTIONNAIRE, HOW DIFFICULT HAVE THESE PROBLEMS MADE IT FOR YOU TO DO YOUR WORK, TAKE CARE OF THINGS AT HOME, OR GET ALONG WITH OTHER PEOPLE: NOT DIFFICULT AT ALL

## 2024-02-28 NOTE — PROGRESS NOTES
ASSESSMENT/PLAN:   Wellness exam  Discussed age appropriate preventive care including healthy diet, daily exercise, immunizations and age & gender guided screening tests.  Reviewed labs form August.   Encourage more consistent physical activity. Discussed prostate cancer screening. Has been very low routinely. Not due for any other labs today. Prefers hold off.    Mixed hyperlipidemia  Assessment & Plan:  Asymptomatic, tolerating atorvastatin with no difficulty.  Last lipid profile at goal.     Skin lesion of face  Comments:  Concern for squamous cell carcinoma.   Refer to derm for biopsy/excision.      Return in about 6 months (around 2024).         Harsha Mcbride (:  1965) is a 58 y.o. male, here for annual preventive visit.    Exercise: not enough in the winter. \"Ambitions and schedule don't fit.\"  Diet: healthy    Patient Care Team:  Shelbie Torres MD as PCP - General (Internal Medicine)  [facility]  Jo Go (Dermatology)    Health Maintenance   Topic Date Due    Respiratory Syncytial Virus (RSV) Pregnant or age 60 yrs+ (1 - 1-dose 60+ series) 2024 (Originally 2023)    DTaP/Tdap/Td vaccine (2 - Td or Tdap) 2024    Lipids  2024    Colorectal Cancer Screen  2025    Depression Monitoring  2025    Flu vaccine  Completed    Shingles vaccine  Completed    COVID-19 Vaccine  Completed    Hepatitis C screen  Completed    Hepatitis A vaccine  Aged Out    Hepatitis B vaccine  Aged Out    Hib vaccine  Aged Out    Polio vaccine  Aged Out    Meningococcal (ACWY) vaccine  Aged Out    Pneumococcal 0-64 years Vaccine  Aged Out    Depression Screen  Discontinued    Diabetes screen  Discontinued    HIV screen  Discontinued    Prostate Specific Antigen (PSA) Screening or Monitoring  Discontinued     Immunization History   Administered Date(s) Administered    COVID-19, MODERNA BLUE border, Primary or Immunocompromised, (age 12y+), IM, 100

## 2024-02-28 NOTE — PATIENT INSTRUCTIONS
juice.   8. Eating less salt is good for everyone’s health. Choose more fresh foods and fewer processed foods.    Aim for 2-3 cups of vegetables daily and 1 1/2-2 cups of fruits daily.

## 2024-06-03 ENCOUNTER — TELEPHONE (OUTPATIENT)
Dept: PRIMARY CARE CLINIC | Age: 59
End: 2024-06-03

## 2024-06-03 NOTE — TELEPHONE ENCOUNTER
----- Message from Congdidi Sanchez sent at 6/3/2024  9:45 AM EDT -----  Regarding: [facility] Appointment Request  [facility] Appointment Request    Patient needs appointment for [facility] Appointment Type: New to Provider.    Reason for Appointment Request: No appointments available during search/anytime between now untiol July18/patient is out of town on the following day.Dr.Courtney Ramos.  --------------------------------------------------------------------------------------------------------------------------    Relationship to Patient: Self     Call Back Information: OK to leave message on voicemail  Preferred Call Back Number: Phone [phone]

## 2024-06-04 SDOH — HEALTH STABILITY: PHYSICAL HEALTH: ON AVERAGE, HOW MANY DAYS PER WEEK DO YOU ENGAGE IN MODERATE TO STRENUOUS EXERCISE (LIKE A BRISK WALK)?: 5 DAYS

## 2024-06-04 SDOH — HEALTH STABILITY: PHYSICAL HEALTH: ON AVERAGE, HOW MANY MINUTES DO YOU ENGAGE IN EXERCISE AT THIS LEVEL?: 30 MIN

## 2024-06-07 ENCOUNTER — OFFICE VISIT (OUTPATIENT)
Dept: FAMILY MEDICINE CLINIC | Age: 59
End: 2024-06-07

## 2024-06-07 VITALS
SYSTOLIC BLOOD PRESSURE: 122 MMHG | WEIGHT: 181 LBS | HEART RATE: 67 BPM | BODY MASS INDEX: 30.12 KG/M2 | DIASTOLIC BLOOD PRESSURE: 82 MMHG | OXYGEN SATURATION: 98 %

## 2024-06-07 DIAGNOSIS — E78.2 MIXED HYPERLIPIDEMIA: Primary | ICD-10-CM

## 2024-06-07 DIAGNOSIS — Z00.00 HEALTH CARE MAINTENANCE: ICD-10-CM

## 2024-06-07 DIAGNOSIS — F41.8 DEPRESSION WITH ANXIETY: ICD-10-CM

## 2024-06-07 RX ORDER — BUPROPION HYDROCHLORIDE 150 MG/1
150 TABLET, EXTENDED RELEASE ORAL DAILY
Qty: 90 TABLET | Refills: 1 | Status: SHIPPED | OUTPATIENT
Start: 2024-06-07

## 2024-06-07 RX ORDER — ATORVASTATIN CALCIUM 40 MG/1
40 TABLET, FILM COATED ORAL DAILY
Qty: 90 TABLET | Refills: 1 | Status: SHIPPED | OUTPATIENT
Start: 2024-06-07

## 2024-06-07 NOTE — PROGRESS NOTES
Chief Complaint   Patient presents with    Established New Doctor     New to provider          HPI: Harsha Mcbride is a 59 y.o. male who presents for Med Refill    #HLD  #MDD  -Is currently on atorvastatin 40 mg nightly, Wellbutrin 150 mg ER daily, MDD well controlled on this dose, tolerating it well, denies SI or HI  -Is trying to work on diet and exercise to help HLD, is losing weight, denies side effect with med   -/82 today   -Lipid panel below, needs both refilled,   Lab Results   Component Value Date    CHOL 146 08/09/2023    TRIG 161 (H) 08/09/2023    HDL 48 08/09/2023    LDL 66 08/09/2023    VLDL 32 08/09/2023        Lab Results   Component Value Date    CREATININE 0.9 08/09/2023    BUN 14 08/09/2023     08/09/2023    K 4.2 08/09/2023    CL 98 (L) 08/09/2023    CO2 23 08/09/2023        Past Medical History:   Diagnosis Date    Agatston coronary artery calcium score between 100 and 400 2/18/2022    Environmental allergies     History of depression     in family    Personal history of COVID-19 2/9/2021 1/20/2021    Scoliosis     as teen       Past Surgical History:   Procedure Laterality Date    NASAL SEPTUM SURGERY  1997    TONSILLECTOMY        Current Outpatient Medications   Medication Sig Dispense Refill    atorvastatin (LIPITOR) 40 MG tablet Take 1 tablet by mouth daily 90 tablet 1    buPROPion (WELLBUTRIN SR) 150 MG extended release tablet Take 1 tablet by mouth daily 90 tablet 1    loratadine (CLARITIN) 10 MG tablet Take 1 tablet by mouth daily      VITAMIN D PO Take by mouth      aspirin EC 81 MG EC tablet Take 1 tablet by mouth daily 90 tablet 1    fluticasone (FLONASE) 50 MCG/ACT nasal spray SPRAY 2 SPRAYS BY NASAL ROUTE DAILY 16 g 5     No current facility-administered medications for this visit.      Family History   Problem Relation Age of Onset    Depression Mother     Alzheimer's Disease Mother 82    Coronary Art Dis Father 75        stents and bypass    Other Sister

## 2024-09-10 SDOH — HEALTH STABILITY: PHYSICAL HEALTH: ON AVERAGE, HOW MANY DAYS PER WEEK DO YOU ENGAGE IN MODERATE TO STRENUOUS EXERCISE (LIKE A BRISK WALK)?: 4 DAYS

## 2024-09-10 SDOH — HEALTH STABILITY: PHYSICAL HEALTH: ON AVERAGE, HOW MANY MINUTES DO YOU ENGAGE IN EXERCISE AT THIS LEVEL?: 30 MIN

## 2024-09-13 ENCOUNTER — OFFICE VISIT (OUTPATIENT)
Dept: PRIMARY CARE CLINIC | Age: 59
End: 2024-09-13
Payer: COMMERCIAL

## 2024-09-13 VITALS
DIASTOLIC BLOOD PRESSURE: 85 MMHG | HEART RATE: 63 BPM | WEIGHT: 180.2 LBS | SYSTOLIC BLOOD PRESSURE: 133 MMHG | TEMPERATURE: 97.5 F | BODY MASS INDEX: 30.02 KG/M2 | OXYGEN SATURATION: 98 % | HEIGHT: 65 IN

## 2024-09-13 DIAGNOSIS — F41.8 DEPRESSION WITH ANXIETY: Primary | ICD-10-CM

## 2024-09-13 DIAGNOSIS — E78.2 MIXED HYPERLIPIDEMIA: ICD-10-CM

## 2024-09-13 DIAGNOSIS — Z91.09 ENVIRONMENTAL ALLERGIES: ICD-10-CM

## 2024-09-13 DIAGNOSIS — Z12.5 SCREENING PSA (PROSTATE SPECIFIC ANTIGEN): ICD-10-CM

## 2024-09-13 PROBLEM — L30.1 VESICULAR HAND ECZEMA: Status: ACTIVE | Noted: 2024-04-10

## 2024-09-13 PROBLEM — Z86.16 PERSONAL HISTORY OF COVID-19: Status: RESOLVED | Noted: 2021-02-09 | Resolved: 2024-09-13

## 2024-09-13 PROBLEM — E66.811 CLASS 1 OBESITY WITH SERIOUS COMORBIDITY AND BODY MASS INDEX (BMI) OF 31.0 TO 31.9 IN ADULT: Status: RESOLVED | Noted: 2023-08-09 | Resolved: 2024-09-13

## 2024-09-13 PROBLEM — E66.9 CLASS 1 OBESITY WITH SERIOUS COMORBIDITY AND BODY MASS INDEX (BMI) OF 31.0 TO 31.9 IN ADULT: Status: RESOLVED | Noted: 2023-08-09 | Resolved: 2024-09-13

## 2024-09-13 PROBLEM — L82.1 SEBORRHEIC KERATOSIS: Status: ACTIVE | Noted: 2024-04-10

## 2024-09-13 PROCEDURE — 90715 TDAP VACCINE 7 YRS/> IM: CPT | Performed by: FAMILY MEDICINE

## 2024-09-13 PROCEDURE — G8417 CALC BMI ABV UP PARAM F/U: HCPCS | Performed by: FAMILY MEDICINE

## 2024-09-13 PROCEDURE — 1036F TOBACCO NON-USER: CPT | Performed by: FAMILY MEDICINE

## 2024-09-13 PROCEDURE — 99214 OFFICE O/P EST MOD 30 MIN: CPT | Performed by: FAMILY MEDICINE

## 2024-09-13 PROCEDURE — G8427 DOCREV CUR MEDS BY ELIG CLIN: HCPCS | Performed by: FAMILY MEDICINE

## 2024-09-13 PROCEDURE — 3017F COLORECTAL CA SCREEN DOC REV: CPT | Performed by: FAMILY MEDICINE

## 2024-09-13 PROCEDURE — 90471 IMMUNIZATION ADMIN: CPT | Performed by: FAMILY MEDICINE

## 2024-09-13 RX ORDER — ATORVASTATIN CALCIUM 40 MG/1
40 TABLET, FILM COATED ORAL DAILY
Qty: 90 TABLET | Refills: 1 | Status: SHIPPED | OUTPATIENT
Start: 2024-09-13

## 2024-09-13 RX ORDER — BUPROPION HYDROCHLORIDE 150 MG/1
150 TABLET, EXTENDED RELEASE ORAL DAILY
Qty: 90 TABLET | Refills: 1 | Status: SHIPPED | OUTPATIENT
Start: 2024-09-13

## 2024-09-13 ASSESSMENT — PATIENT HEALTH QUESTIONNAIRE - PHQ9
10. IF YOU CHECKED OFF ANY PROBLEMS, HOW DIFFICULT HAVE THESE PROBLEMS MADE IT FOR YOU TO DO YOUR WORK, TAKE CARE OF THINGS AT HOME, OR GET ALONG WITH OTHER PEOPLE: NOT DIFFICULT AT ALL
SUM OF ALL RESPONSES TO PHQ9 QUESTIONS 1 & 2: 0
SUM OF ALL RESPONSES TO PHQ QUESTIONS 1-9: 0
9. THOUGHTS THAT YOU WOULD BE BETTER OFF DEAD, OR OF HURTING YOURSELF: NOT AT ALL
2. FEELING DOWN, DEPRESSED OR HOPELESS: NOT AT ALL
5. POOR APPETITE OR OVEREATING: NOT AT ALL
7. TROUBLE CONCENTRATING ON THINGS, SUCH AS READING THE NEWSPAPER OR WATCHING TELEVISION: NOT AT ALL
4. FEELING TIRED OR HAVING LITTLE ENERGY: NOT AT ALL
8. MOVING OR SPEAKING SO SLOWLY THAT OTHER PEOPLE COULD HAVE NOTICED. OR THE OPPOSITE, BEING SO FIGETY OR RESTLESS THAT YOU HAVE BEEN MOVING AROUND A LOT MORE THAN USUAL: NOT AT ALL
SUM OF ALL RESPONSES TO PHQ QUESTIONS 1-9: 0
6. FEELING BAD ABOUT YOURSELF - OR THAT YOU ARE A FAILURE OR HAVE LET YOURSELF OR YOUR FAMILY DOWN: NOT AT ALL
3. TROUBLE FALLING OR STAYING ASLEEP: NOT AT ALL
SUM OF ALL RESPONSES TO PHQ QUESTIONS 1-9: 0
SUM OF ALL RESPONSES TO PHQ QUESTIONS 1-9: 0
1. LITTLE INTEREST OR PLEASURE IN DOING THINGS: NOT AT ALL

## 2024-09-13 ASSESSMENT — ENCOUNTER SYMPTOMS
SHORTNESS OF BREATH: 0
COUGH: 0
CONSTIPATION: 0
DIARRHEA: 0
NAUSEA: 0
VOMITING: 0

## 2024-10-09 DIAGNOSIS — F41.8 DEPRESSION WITH ANXIETY: ICD-10-CM

## 2024-10-09 DIAGNOSIS — Z12.5 SCREENING PSA (PROSTATE SPECIFIC ANTIGEN): ICD-10-CM

## 2024-10-09 DIAGNOSIS — E78.2 MIXED HYPERLIPIDEMIA: ICD-10-CM

## 2024-10-09 LAB
ALBUMIN SERPL-MCNC: 4.2 G/DL (ref 3.4–5)
ALBUMIN/GLOB SERPL: 1.8 {RATIO} (ref 1.1–2.2)
ALP SERPL-CCNC: 81 U/L (ref 40–129)
ALT SERPL-CCNC: 26 U/L (ref 10–40)
ANION GAP SERPL CALCULATED.3IONS-SCNC: 9 MMOL/L (ref 3–16)
AST SERPL-CCNC: 24 U/L (ref 15–37)
BILIRUB DIRECT SERPL-MCNC: 0.2 MG/DL (ref 0–0.3)
BILIRUB INDIRECT SERPL-MCNC: 0.3 MG/DL (ref 0–1)
BILIRUB SERPL-MCNC: 0.5 MG/DL (ref 0–1)
BUN SERPL-MCNC: 14 MG/DL (ref 7–20)
CALCIUM SERPL-MCNC: 9.3 MG/DL (ref 8.3–10.6)
CHLORIDE SERPL-SCNC: 102 MMOL/L (ref 99–110)
CHOLEST SERPL-MCNC: 158 MG/DL (ref 0–199)
CO2 SERPL-SCNC: 26 MMOL/L (ref 21–32)
CREAT SERPL-MCNC: 0.9 MG/DL (ref 0.8–1.3)
GFR SERPLBLD CREATININE-BSD FMLA CKD-EPI: >90 ML/MIN/{1.73_M2}
GLUCOSE SERPL-MCNC: 90 MG/DL (ref 70–99)
HDLC SERPL-MCNC: 54 MG/DL (ref 40–60)
LDLC SERPL CALC-MCNC: 82 MG/DL
POTASSIUM SERPL-SCNC: 4.1 MMOL/L (ref 3.5–5.1)
PROT SERPL-MCNC: 6.6 G/DL (ref 6.4–8.2)
PSA SERPL DL<=0.01 NG/ML-MCNC: 0.72 NG/ML (ref 0–4)
SODIUM SERPL-SCNC: 137 MMOL/L (ref 136–145)
TRIGL SERPL-MCNC: 112 MG/DL (ref 0–150)
VLDLC SERPL CALC-MCNC: 22 MG/DL

## 2025-03-11 ASSESSMENT — PATIENT HEALTH QUESTIONNAIRE - PHQ9
8. MOVING OR SPEAKING SO SLOWLY THAT OTHER PEOPLE COULD HAVE NOTICED. OR THE OPPOSITE - BEING SO FIDGETY OR RESTLESS THAT YOU HAVE BEEN MOVING AROUND A LOT MORE THAN USUAL: NOT AT ALL
2. FEELING DOWN, DEPRESSED OR HOPELESS: SEVERAL DAYS
9. THOUGHTS THAT YOU WOULD BE BETTER OFF DEAD, OR OF HURTING YOURSELF: NOT AT ALL
SUM OF ALL RESPONSES TO PHQ QUESTIONS 1-9: 4
3. TROUBLE FALLING OR STAYING ASLEEP: NOT AT ALL
4. FEELING TIRED OR HAVING LITTLE ENERGY: NOT AT ALL
SUM OF ALL RESPONSES TO PHQ QUESTIONS 1-9: 4
5. POOR APPETITE OR OVEREATING: NOT AT ALL
6. FEELING BAD ABOUT YOURSELF - OR THAT YOU ARE A FAILURE OR HAVE LET YOURSELF OR YOUR FAMILY DOWN: MORE THAN HALF THE DAYS
7. TROUBLE CONCENTRATING ON THINGS, SUCH AS READING THE NEWSPAPER OR WATCHING TELEVISION: SEVERAL DAYS
7. TROUBLE CONCENTRATING ON THINGS, SUCH AS READING THE NEWSPAPER OR WATCHING TELEVISION: SEVERAL DAYS
2. FEELING DOWN, DEPRESSED OR HOPELESS: SEVERAL DAYS
10. IF YOU CHECKED OFF ANY PROBLEMS, HOW DIFFICULT HAVE THESE PROBLEMS MADE IT FOR YOU TO DO YOUR WORK, TAKE CARE OF THINGS AT HOME, OR GET ALONG WITH OTHER PEOPLE: SOMEWHAT DIFFICULT
1. LITTLE INTEREST OR PLEASURE IN DOING THINGS: NOT AT ALL
10. IF YOU CHECKED OFF ANY PROBLEMS, HOW DIFFICULT HAVE THESE PROBLEMS MADE IT FOR YOU TO DO YOUR WORK, TAKE CARE OF THINGS AT HOME, OR GET ALONG WITH OTHER PEOPLE: SOMEWHAT DIFFICULT
9. THOUGHTS THAT YOU WOULD BE BETTER OFF DEAD, OR OF HURTING YOURSELF: NOT AT ALL
SUM OF ALL RESPONSES TO PHQ QUESTIONS 1-9: 4
SUM OF ALL RESPONSES TO PHQ QUESTIONS 1-9: 4
1. LITTLE INTEREST OR PLEASURE IN DOING THINGS: NOT AT ALL
6. FEELING BAD ABOUT YOURSELF - OR THAT YOU ARE A FAILURE OR HAVE LET YOURSELF OR YOUR FAMILY DOWN: MORE THAN HALF THE DAYS
SUM OF ALL RESPONSES TO PHQ QUESTIONS 1-9: 4
4. FEELING TIRED OR HAVING LITTLE ENERGY: NOT AT ALL
8. MOVING OR SPEAKING SO SLOWLY THAT OTHER PEOPLE COULD HAVE NOTICED. OR THE OPPOSITE, BEING SO FIGETY OR RESTLESS THAT YOU HAVE BEEN MOVING AROUND A LOT MORE THAN USUAL: NOT AT ALL
3. TROUBLE FALLING OR STAYING ASLEEP: NOT AT ALL
5. POOR APPETITE OR OVEREATING: NOT AT ALL

## 2025-03-14 ENCOUNTER — OFFICE VISIT (OUTPATIENT)
Dept: PRIMARY CARE CLINIC | Age: 60
End: 2025-03-14

## 2025-03-14 VITALS
SYSTOLIC BLOOD PRESSURE: 160 MMHG | OXYGEN SATURATION: 97 % | TEMPERATURE: 97.9 F | HEIGHT: 65 IN | BODY MASS INDEX: 29.99 KG/M2 | WEIGHT: 180 LBS | HEART RATE: 78 BPM | DIASTOLIC BLOOD PRESSURE: 108 MMHG

## 2025-03-14 DIAGNOSIS — R03.0 ELEVATED BLOOD PRESSURE READING: ICD-10-CM

## 2025-03-14 DIAGNOSIS — Z00.00 ENCOUNTER FOR WELL ADULT EXAM WITHOUT ABNORMAL FINDINGS: Primary | ICD-10-CM

## 2025-03-14 DIAGNOSIS — F41.8 DEPRESSION WITH ANXIETY: ICD-10-CM

## 2025-03-14 RX ORDER — BUPROPION HYDROCHLORIDE 150 MG/1
300 TABLET, EXTENDED RELEASE ORAL DAILY
Qty: 180 TABLET | Refills: 0 | Status: SHIPPED | OUTPATIENT
Start: 2025-03-14 | End: 2025-06-12

## 2025-03-14 SDOH — ECONOMIC STABILITY: FOOD INSECURITY: WITHIN THE PAST 12 MONTHS, YOU WORRIED THAT YOUR FOOD WOULD RUN OUT BEFORE YOU GOT MONEY TO BUY MORE.: NEVER TRUE

## 2025-03-14 SDOH — ECONOMIC STABILITY: FOOD INSECURITY: WITHIN THE PAST 12 MONTHS, THE FOOD YOU BOUGHT JUST DIDN'T LAST AND YOU DIDN'T HAVE MONEY TO GET MORE.: NEVER TRUE

## 2025-03-14 ASSESSMENT — ENCOUNTER SYMPTOMS
VOMITING: 0
DIARRHEA: 0
CONSTIPATION: 0
NAUSEA: 0
COUGH: 0
SHORTNESS OF BREATH: 0

## 2025-03-14 NOTE — PROGRESS NOTES
Harsha Mcbride (:  1965) is a 59 y.o. male,Established patient, here for evaluation of the following chief complaint(s):  Annual Exam, Medication Refill, and Discuss Medications (Increase Wellbutrin possible )    [email or web address]    SUBJECTIVE:  3.14.25:  Exercising more, losing weight, cardio improving, HR improved in going down      Subjective:  - Blood pressure elevation: reports /92 at dentist, similar readings at home  - Anxiety and depression symptoms over winter: mild, no panic attacks  - Sleep issues: using low dose cannabis gummies (2.5-5mg) intermittently with benefit  - Reports improved lifestyle habits: regular exercise (walking, strength training), calorie tracking, weight loss  - Meditation practice: 10-15 minutes, couple times per week  - Reports increased energy and focus over past 6 months  - Currently on bupropion 150mg daily    Past Medical History:  - ADHD (undiagnosed)  - No significant family history of hypertension  - Father with history of hypertension    Objective:  - BP today: elevated, similar to home readings  - Heart: normal rate and rhythm, no murmurs  - Lungs: clear, no wheezing  - Last labs: 10/2024, reported as consistently normal    Assessment & Plan:  1. Elevated Blood Pressure  - Continue home BP monitoring 2-3x/week at different times  - Check BP before bed after 5 minutes rest  - Continue lifestyle modifications  - Monitor and reassess in 6 mo    2. Depression/Anxiety  - Increase bupropion to 300mg daily (two 150mg tablets)  - Current dose well tolerated  - Monitor for side effects  - Contact if issues arise before next appointment    3. Sleep Issues  - Discussed cannabis use and potential effects on REM sleep  - Continue current sleep hygiene practices  - Monitor effectiveness    Follow up in 6 months or sooner if concerns           9.24: establish care; prev pt of Dr. Torres    Depression with anxiety:  - has been on Wellbutrin xl 300 mg , but changed to 150 mg SR

## 2025-03-18 ENCOUNTER — PATIENT MESSAGE (OUTPATIENT)
Dept: PRIMARY CARE CLINIC | Age: 60
End: 2025-03-18

## 2025-03-21 NOTE — TELEPHONE ENCOUNTER
Of course drinking any alcohol should be done in moderation, as you have mentioned and are aware of, too.  As far as if you need to adjust your alcohol intake amounts based on the Wellbutrin -- I would not think that the amount of alcohol you are having + your dose of Wellbutrin is all of a sudden leading to an increase in your blood pressure.    More likely, normal aging is contributing to blood pressure needs --- your body be creating higher pressures to press through any plaques or smaller vasculature to help provide appropriate oxygen levels to your tissues.   We can certainly follow up on the blood pressure readings in the future, and make more decisions based on those levels.   Let me know what you think, and we can have a sooner follow up if you like    Best,  Dr. Ramos

## 2025-04-08 DIAGNOSIS — F41.8 DEPRESSION WITH ANXIETY: ICD-10-CM

## 2025-04-09 RX ORDER — BUPROPION HYDROCHLORIDE 150 MG/1
150 TABLET, EXTENDED RELEASE ORAL DAILY
Qty: 90 TABLET | Refills: 2 | Status: SHIPPED | OUTPATIENT
Start: 2025-04-09

## 2025-04-09 NOTE — TELEPHONE ENCOUNTER
Medication:   Requested Prescriptions     Pending Prescriptions Disp Refills    buPROPion (WELLBUTRIN SR) 150 MG extended release tablet [Pharmacy Med Name: buPROPion HCL  MG TABLET] 90 tablet      Sig: TAKE 1 TABLET BY MOUTH DAILY        Last Filled:      Patient Phone Number: [phone] (home)     Last appt: 3/14/2025   Next appt: 9/19/2025    Last OARRS:        No data to display

## 2025-04-11 ENCOUNTER — PATIENT MESSAGE (OUTPATIENT)
Dept: PRIMARY CARE CLINIC | Age: 60
End: 2025-04-11

## 2025-04-11 DIAGNOSIS — F41.8 DEPRESSION WITH ANXIETY: ICD-10-CM

## 2025-04-14 RX ORDER — BUPROPION HYDROCHLORIDE 150 MG/1
300 TABLET, EXTENDED RELEASE ORAL DAILY
Qty: 360 TABLET | Refills: 0 | Status: SHIPPED | OUTPATIENT
Start: 2025-04-14 | End: 2025-10-11

## 2025-04-14 NOTE — TELEPHONE ENCOUNTER
Apologies, the old prescription dose was renewed electronically.  I have sent in a new prescription to show that he will be taking the 2 x 150 mg tablets daily

## 2025-04-22 ENCOUNTER — PATIENT MESSAGE (OUTPATIENT)
Dept: PRIMARY CARE CLINIC | Age: 60
End: 2025-04-22

## 2025-04-25 ENCOUNTER — TELEMEDICINE (OUTPATIENT)
Dept: PRIMARY CARE CLINIC | Age: 60
End: 2025-04-25
Payer: COMMERCIAL

## 2025-04-25 DIAGNOSIS — I10 ESSENTIAL HYPERTENSION: Primary | ICD-10-CM

## 2025-04-25 DIAGNOSIS — F41.8 DEPRESSION WITH ANXIETY: ICD-10-CM

## 2025-04-25 PROCEDURE — 99214 OFFICE O/P EST MOD 30 MIN: CPT | Performed by: FAMILY MEDICINE

## 2025-04-25 RX ORDER — BUPROPION HYDROCHLORIDE 150 MG/1
150 TABLET, EXTENDED RELEASE ORAL DAILY
Qty: 180 TABLET | Refills: 0 | Status: SHIPPED | OUTPATIENT
Start: 2025-04-25 | End: 2025-10-22

## 2025-04-25 RX ORDER — LOSARTAN POTASSIUM 25 MG/1
25 TABLET ORAL DAILY
Qty: 90 TABLET | Refills: 1 | Status: SHIPPED | OUTPATIENT
Start: 2025-04-25

## 2025-04-25 RX ORDER — BUPROPION HYDROCHLORIDE 75 MG/1
75 TABLET ORAL
Qty: 90 TABLET | Refills: 1 | Status: SHIPPED | OUTPATIENT
Start: 2025-04-25

## 2025-04-25 NOTE — PROGRESS NOTES
Harsha Mcbride, was evaluated through a synchronous (real-time) audio-video encounter. The patient (or guardian if applicable) is aware that this is a billable service, which includes applicable co-pays. This Virtual Visit was conducted with patient's (and/or legal guardian's) consent. Patient identification was verified, and a caregiver was present when appropriate.   The patient was located at Home: [address] [place] [address]  Provider was located at Facility (Appt Dept): [address] [place], [place] [id number]  Confirm you are appropriately licensed, registered, or certified to deliver care in the state where the patient is located as indicated above. If you are not or unsure, please re-schedule the visit: Yes, I confirm.     Harsha Mcbride (:  1965) is a Established patient, presenting virtually for evaluation of the following:      Below is the assessment and plan developed based on review of pertinent history, physical exam, labs, studies, and medications.     Assessment & Plan  Essential hypertension       Orders:    losartan (COZAAR) 25 MG tablet; Take 1 tablet by mouth daily    Depression with anxiety       Orders:    buPROPion (WELLBUTRIN) 75 MG tablet; Take 1 tablet by mouth every morning (before breakfast)    buPROPion (WELLBUTRIN SR) 150 MG extended release tablet; Take 1 tablet by mouth daily      No follow-ups on file.       Subjective   25: virtual visit    Subjective:  - Medication review: Wellbutrin 300mg daily (2x 150mg XR). Reports anxiety almost gone, not depressed, improved focus and productivity  - Side effects: Feels \"jacked up on massive caffeine\", cannot relax, sleep quality declining  - Sleep: Last night slept 2.5-3 hours, worst night in long time  - BP: 135/90, not going below 90 even with relaxation  - Duration: Taking current dose for 6-8 weeks, feels hyperactivity getting worse  - Exercise: Walks 2-3 miles 4-5 times weekly  - Recent episode: Felt

## 2025-04-25 NOTE — ASSESSMENT & PLAN NOTE
Orders:    buPROPion (WELLBUTRIN) 75 MG tablet; Take 1 tablet by mouth every morning (before breakfast)    buPROPion (WELLBUTRIN SR) 150 MG extended release tablet; Take 1 tablet by mouth daily

## 2025-06-18 ENCOUNTER — OFFICE VISIT (OUTPATIENT)
Dept: PRIMARY CARE CLINIC | Age: 60
End: 2025-06-18
Payer: COMMERCIAL

## 2025-06-18 VITALS
DIASTOLIC BLOOD PRESSURE: 82 MMHG | HEART RATE: 69 BPM | BODY MASS INDEX: 30.06 KG/M2 | WEIGHT: 180.4 LBS | SYSTOLIC BLOOD PRESSURE: 136 MMHG | HEIGHT: 65 IN | OXYGEN SATURATION: 88 % | TEMPERATURE: 98.3 F

## 2025-06-18 DIAGNOSIS — M79.674 PAIN IN TOE OF RIGHT FOOT: ICD-10-CM

## 2025-06-18 DIAGNOSIS — G47.09 OTHER INSOMNIA: Primary | ICD-10-CM

## 2025-06-18 DIAGNOSIS — F41.8 DEPRESSION WITH ANXIETY: ICD-10-CM

## 2025-06-18 DIAGNOSIS — Z12.11 SCREENING FOR COLON CANCER: ICD-10-CM

## 2025-06-18 PROCEDURE — 99214 OFFICE O/P EST MOD 30 MIN: CPT | Performed by: FAMILY MEDICINE

## 2025-06-18 RX ORDER — TRAZODONE HYDROCHLORIDE 50 MG/1
50 TABLET ORAL NIGHTLY
Qty: 90 TABLET | Refills: 1 | Status: SHIPPED | OUTPATIENT
Start: 2025-06-18

## 2025-06-18 RX ORDER — BUPROPION HYDROCHLORIDE 150 MG/1
150 TABLET, EXTENDED RELEASE ORAL 2 TIMES DAILY
Qty: 180 TABLET | Refills: 0 | Status: SHIPPED | OUTPATIENT
Start: 2025-06-18 | End: 2025-09-16

## 2025-06-18 SDOH — ECONOMIC STABILITY: FOOD INSECURITY: WITHIN THE PAST 12 MONTHS, YOU WORRIED THAT YOUR FOOD WOULD RUN OUT BEFORE YOU GOT MONEY TO BUY MORE.: NEVER TRUE

## 2025-06-18 SDOH — ECONOMIC STABILITY: FOOD INSECURITY: WITHIN THE PAST 12 MONTHS, THE FOOD YOU BOUGHT JUST DIDN'T LAST AND YOU DIDN'T HAVE MONEY TO GET MORE.: NEVER TRUE

## 2025-06-18 ASSESSMENT — PATIENT HEALTH QUESTIONNAIRE - PHQ9
7. TROUBLE CONCENTRATING ON THINGS, SUCH AS READING THE NEWSPAPER OR WATCHING TELEVISION: NOT AT ALL
SUM OF ALL RESPONSES TO PHQ QUESTIONS 1-9: 0
10. IF YOU CHECKED OFF ANY PROBLEMS, HOW DIFFICULT HAVE THESE PROBLEMS MADE IT FOR YOU TO DO YOUR WORK, TAKE CARE OF THINGS AT HOME, OR GET ALONG WITH OTHER PEOPLE: NOT DIFFICULT AT ALL
SUM OF ALL RESPONSES TO PHQ QUESTIONS 1-9: 0
9. THOUGHTS THAT YOU WOULD BE BETTER OFF DEAD, OR OF HURTING YOURSELF: NOT AT ALL
3. TROUBLE FALLING OR STAYING ASLEEP: NOT AT ALL
2. FEELING DOWN, DEPRESSED OR HOPELESS: NOT AT ALL
6. FEELING BAD ABOUT YOURSELF - OR THAT YOU ARE A FAILURE OR HAVE LET YOURSELF OR YOUR FAMILY DOWN: NOT AT ALL
4. FEELING TIRED OR HAVING LITTLE ENERGY: NOT AT ALL
SUM OF ALL RESPONSES TO PHQ QUESTIONS 1-9: 0
8. MOVING OR SPEAKING SO SLOWLY THAT OTHER PEOPLE COULD HAVE NOTICED. OR THE OPPOSITE, BEING SO FIGETY OR RESTLESS THAT YOU HAVE BEEN MOVING AROUND A LOT MORE THAN USUAL: NOT AT ALL
1. LITTLE INTEREST OR PLEASURE IN DOING THINGS: NOT AT ALL
SUM OF ALL RESPONSES TO PHQ QUESTIONS 1-9: 0
5. POOR APPETITE OR OVEREATING: NOT AT ALL

## 2025-06-18 ASSESSMENT — ENCOUNTER SYMPTOMS
CONSTIPATION: 0
DIARRHEA: 0
VOMITING: 0
COUGH: 0
SHORTNESS OF BREATH: 0
NAUSEA: 0

## 2025-06-18 NOTE — PROGRESS NOTES
Harsha Mcbride (:  1965) is a 60 y.o. male,Established patient, here for evaluation of the following chief complaint(s):  Hypertension (Discuss medications), Medication Check (Discuss medication- Bupropion), and Referral - General (Podiatry- couple issues with feet- rash on both feet, possible broken toe left foot, great toe/Gastro- Colonoscopy)    [employer] [facility]  Extended bupropion to short acting instead?    SUBJECTIVE:  6..25:    Hypertension  Buproprion dosing  Needs colonoscopy referral  Referral for podiatry? May have broken toe vs arthritis    At home 137 systolic, diastolic    Subjective:  -  reports that his blood pressure readings at home show a low diastolic pressure of 80-82, which is considered good. The systolic pressure is typically around 137 at rest.  -  expresses concern that top number (systolic) has not decreased significantly, while the bottom number (diastolic) has.  - reports that sleep quality has improved slightly since changing medication, but it is still not at the quality he used to experience. He monitors his sleep, which he believes might contribute to his focus on it.  -  experiences nights where he gets only four to five hours of sleep, while other nights he gets sufficient sleep. Overall, sleep quality has not been great, although it is better than before.  - theorized that his sleep issues were related to being taken off extended-release medication.  - reports that he usually falls asleep immediately but does not stay asleep or wakes up in the middle of the night.  - When waking up in the middle of the night, Harsha Mcbride reports that sometimes he can go back to sleep, but other times he is awake for hours. He also occasionally has trouble falling asleep.  -  has attempted self-treatment for sleep issues, including stopping gummies and abstaining from alcohol for two weeks, neither of which had any effect.  - reports that even after significant physical exertion, such as

## 2025-07-13 DIAGNOSIS — E78.2 MIXED HYPERLIPIDEMIA: ICD-10-CM

## 2025-07-14 RX ORDER — ATORVASTATIN CALCIUM 40 MG/1
40 TABLET, FILM COATED ORAL DAILY
Qty: 90 TABLET | Refills: 1 | Status: SHIPPED | OUTPATIENT
Start: 2025-07-14

## 2025-07-14 NOTE — TELEPHONE ENCOUNTER
Medication:   Requested Prescriptions     Pending Prescriptions Disp Refills    atorvastatin (LIPITOR) 40 MG tablet [Pharmacy Med Name: ATORVASTATIN 40 MG TABLET] 90 tablet 1     Sig: TAKE 1 TABLET BY MOUTH DAILY        Last Filled:      Patient Phone Number: [phone] (home)     Last appt: 6/18/2025   Next appt: 7/16/2025    Last OARRS:        No data to display

## 2025-07-16 ENCOUNTER — PATIENT MESSAGE (OUTPATIENT)
Dept: PRIMARY CARE CLINIC | Age: 60
End: 2025-07-16

## 2025-08-11 ENCOUNTER — TELEMEDICINE ON DEMAND (OUTPATIENT)
Age: 60
End: 2025-08-11
Payer: COMMERCIAL

## 2025-08-11 DIAGNOSIS — L30.9 DERMATITIS: Primary | ICD-10-CM

## 2025-08-11 DIAGNOSIS — L25.5 DERMATITIS DUE TO PLANTS, INCLUDING POISON IVY, SUMAC, AND OAK: ICD-10-CM

## 2025-08-11 PROCEDURE — 99213 OFFICE O/P EST LOW 20 MIN: CPT | Performed by: NURSE PRACTITIONER

## 2025-08-11 RX ORDER — PREDNISONE 10 MG/1
TABLET ORAL
Qty: 30 TABLET | Refills: 0 | Status: SHIPPED | OUTPATIENT
Start: 2025-08-11